# Patient Record
Sex: MALE | Race: WHITE | NOT HISPANIC OR LATINO | Employment: FULL TIME | URBAN - METROPOLITAN AREA
[De-identification: names, ages, dates, MRNs, and addresses within clinical notes are randomized per-mention and may not be internally consistent; named-entity substitution may affect disease eponyms.]

---

## 2017-01-02 ENCOUNTER — HOSPITAL ENCOUNTER (OUTPATIENT)
Dept: OTHER | Facility: HOSPITAL | Age: 37
Discharge: HOME | End: 2017-01-02
Attending: FAMILY MEDICINE | Admitting: FAMILY MEDICINE

## 2020-03-02 RX ORDER — QUETIAPINE 150 MG/1
TABLET, FILM COATED, EXTENDED RELEASE ORAL
Qty: 30 TABLET | Refills: 12 | OUTPATIENT
Start: 2020-03-02

## 2020-03-03 ENCOUNTER — OFFICE VISIT (OUTPATIENT)
Dept: FAMILY MEDICINE CLINIC | Facility: CLINIC | Age: 40
End: 2020-03-03

## 2020-03-03 VITALS
RESPIRATION RATE: 18 BRPM | HEART RATE: 101 BPM | BODY MASS INDEX: 36.79 KG/M2 | TEMPERATURE: 98.2 F | DIASTOLIC BLOOD PRESSURE: 88 MMHG | WEIGHT: 257 LBS | OXYGEN SATURATION: 96 % | SYSTOLIC BLOOD PRESSURE: 130 MMHG | HEIGHT: 70 IN

## 2020-03-03 DIAGNOSIS — F41.9 ANXIETY: ICD-10-CM

## 2020-03-03 DIAGNOSIS — F32.A DEPRESSIVE DISORDER: Primary | ICD-10-CM

## 2020-03-03 PROBLEM — M19.90 OSTEOARTHROSIS: Status: ACTIVE | Noted: 2020-03-03

## 2020-03-03 PROCEDURE — 99213 OFFICE O/P EST LOW 20 MIN: CPT | Performed by: FAMILY MEDICINE

## 2020-03-03 RX ORDER — NICOTINE POLACRILEX 4 MG/1
1 GUM, CHEWING ORAL DAILY
COMMUNITY

## 2020-03-03 RX ORDER — QUETIAPINE 150 MG/1
150 TABLET, FILM COATED, EXTENDED RELEASE ORAL DAILY
Qty: 30 TABLET | Refills: 12 | Status: SHIPPED | OUTPATIENT
Start: 2020-03-03 | End: 2021-03-09 | Stop reason: SDUPTHER

## 2020-03-03 RX ORDER — QUETIAPINE 150 MG/1
150 TABLET, FILM COATED, EXTENDED RELEASE ORAL DAILY
COMMUNITY
Start: 2020-01-31 | End: 2020-03-03 | Stop reason: SDUPTHER

## 2020-03-03 NOTE — PROGRESS NOTES
Subjective   Tommie Barron is a 39 y.o. male.     Med Refill    Depression   Visit Type: follow-up  Patient presents with the following symptoms: depressed mood and nervousness/anxiety.  Patient is not experiencing: shortness of breath and suicidal ideas.  Frequency of symptoms: most days   Sleep per night: 8 hours  Sleep quality: good  Compliance with medications:  %             The following portions of the patient's history were reviewed and updated as appropriate: allergies, current medications, past family history, past medical history, past social history, past surgical history and problem list.    Patient Active Problem List   Diagnosis   • Anxiety   • Depressive disorder   • Osteoarthrosis       Current Outpatient Medications on File Prior to Visit   Medication Sig Dispense Refill   • Boswellia-Glucosamine-Vit D (OSTEO BI-FLEX ONE PER DAY PO) Take  by mouth.     • Omeprazole 20 MG tablet delayed-release Take 1 tablet by mouth Daily.     • [DISCONTINUED] QUEtiapine XR (SEROquel XR) 150 MG 24 hr tablet Take 150 mg by mouth Daily.       No current facility-administered medications on file prior to visit.      Current outpatient and discharge medications have been reconciled for the patient.  Reviewed by: Checo Mckinley MD      No Known Allergies    Review of Systems   Constitutional: Negative for activity change, appetite change, fatigue and fever.   HENT: Negative for ear pain, swollen glands and voice change.    Eyes: Negative for visual disturbance.   Respiratory: Negative for shortness of breath and wheezing.    Cardiovascular: Negative for chest pain and leg swelling.   Gastrointestinal: Negative for abdominal pain, blood in stool, constipation, diarrhea, nausea and vomiting.   Endocrine: Negative for polydipsia and polyuria.   Genitourinary: Negative for dysuria, frequency and hematuria.   Musculoskeletal: Negative for joint swelling, neck pain and neck stiffness.   Skin: Negative for rash  and bruise.   Neurological: Negative for weakness, numbness and headache.   Psychiatric/Behavioral: Positive for depressed mood. Negative for suicidal ideas. The patient is nervous/anxious.      I have reviewed and confirmed the accuracy of the ROS as documented by the MA/LPN/RN Checo Mckinley MD      Objective   Vitals:    03/03/20 1704   BP: 130/88   Pulse: 101   Resp: 18   Temp: 98.2 °F (36.8 °C)   SpO2: 96%     Physical Exam   Constitutional: He is oriented to person, place, and time. He appears well-developed and well-nourished.   Eyes: Pupils are equal, round, and reactive to light. Conjunctivae and EOM are normal.   Neck: Normal range of motion. Neck supple.   Cardiovascular: Normal rate, regular rhythm and normal heart sounds.   Pulmonary/Chest: Effort normal and breath sounds normal.   Abdominal: Soft. Bowel sounds are normal.   Musculoskeletal: Normal range of motion.   Neurological: He is alert and oriented to person, place, and time.   Skin: Skin is warm and dry.   Psychiatric: He has a normal mood and affect. His behavior is normal. Judgment and thought content normal.         Assessment/Plan .  Problem List Items Addressed This Visit     Anxiety    Relevant Medications    QUEtiapine XR (SEROquel XR) 150 MG 24 hr tablet    Depressive disorder - Primary    Overview     [facility].  Follow-up in 2 weeks.  Consider adding wellbutrin.         Relevant Medications    QUEtiapine XR (SEROquel XR) 150 MG 24 hr tablet      Findings discussed. All questions answered.  Medication and medication adverse effects discussed.  Drug education given and explained to patient. Patient verbalized understanding.  Follow-up for routine health maintenance as directed Recommended follow-up for full physical examination and routine health maintanence.

## 2020-07-07 ENCOUNTER — TELEPHONE (OUTPATIENT)
Dept: FAMILY MEDICINE CLINIC | Facility: CLINIC | Age: 40
End: 2020-07-07

## 2020-07-07 PROCEDURE — U0003 INFECTIOUS AGENT DETECTION BY NUCLEIC ACID (DNA OR RNA); SEVERE ACUTE RESPIRATORY SYNDROME CORONAVIRUS 2 (SARS-COV-2) (CORONAVIRUS DISEASE [COVID-19]), AMPLIFIED PROBE TECHNIQUE, MAKING USE OF HIGH THROUGHPUT TECHNOLOGIES AS DESCRIBED BY CMS-2020-01-R: HCPCS | Performed by: FAMILY MEDICINE

## 2020-07-10 ENCOUNTER — TELEPHONE (OUTPATIENT)
Dept: FAMILY MEDICINE CLINIC | Facility: CLINIC | Age: 40
End: 2020-07-10

## 2020-07-10 DIAGNOSIS — J02.9 SORE THROAT: Primary | ICD-10-CM

## 2020-07-10 DIAGNOSIS — R50.9 FEVER, UNSPECIFIED FEVER CAUSE: ICD-10-CM

## 2020-07-10 RX ORDER — AZITHROMYCIN 250 MG/1
TABLET, FILM COATED ORAL
Qty: 6 TABLET | Refills: 0 | Status: SHIPPED | OUTPATIENT
Start: 2020-07-10 | End: 2021-03-09 | Stop reason: SDUPTHER

## 2020-07-10 NOTE — TELEPHONE ENCOUNTER
Pt called.  States he went to urgent care on 7-7 for s/t fever and was tested for covid.  Test came back negative but throat is still sore and still running fever and they didn't give him any antibiotics.  Notified pt per Dr. Mckinley will send in PhoneTell.  Rx sent to [facility].

## 2021-03-09 ENCOUNTER — OFFICE VISIT (OUTPATIENT)
Dept: FAMILY MEDICINE CLINIC | Facility: CLINIC | Age: 41
End: 2021-03-09

## 2021-03-09 VITALS
SYSTOLIC BLOOD PRESSURE: 130 MMHG | HEART RATE: 104 BPM | WEIGHT: 261.8 LBS | OXYGEN SATURATION: 95 % | TEMPERATURE: 97.3 F | DIASTOLIC BLOOD PRESSURE: 80 MMHG | RESPIRATION RATE: 17 BRPM | HEIGHT: 70 IN | BODY MASS INDEX: 37.48 KG/M2

## 2021-03-09 DIAGNOSIS — F32.A DEPRESSIVE DISORDER: ICD-10-CM

## 2021-03-09 DIAGNOSIS — Z00.01 ENCOUNTER FOR ANNUAL GENERAL MEDICAL EXAMINATION WITH ABNORMAL FINDINGS IN ADULT: Primary | ICD-10-CM

## 2021-03-09 DIAGNOSIS — Z72.0 TOBACCO USE: ICD-10-CM

## 2021-03-09 DIAGNOSIS — E66.3 OVER WEIGHT: ICD-10-CM

## 2021-03-09 DIAGNOSIS — F41.9 ANXIETY: ICD-10-CM

## 2021-03-09 DIAGNOSIS — R05.9 COUGH: ICD-10-CM

## 2021-03-09 DIAGNOSIS — E66.01 MORBIDLY OBESE (HCC): ICD-10-CM

## 2021-03-09 DIAGNOSIS — J40 BRONCHITIS: ICD-10-CM

## 2021-03-09 PROCEDURE — 99213 OFFICE O/P EST LOW 20 MIN: CPT | Performed by: FAMILY MEDICINE

## 2021-03-09 PROCEDURE — 99396 PREV VISIT EST AGE 40-64: CPT | Performed by: FAMILY MEDICINE

## 2021-03-09 RX ORDER — NICOTINE 21-14-7MG
1 KIT TRANSDERMAL DAILY
Qty: 1 EACH | Refills: 0 | Status: SHIPPED | OUTPATIENT
Start: 2021-03-09 | End: 2023-02-14

## 2021-03-09 RX ORDER — QUETIAPINE 150 MG/1
150 TABLET, FILM COATED, EXTENDED RELEASE ORAL DAILY
Qty: 30 TABLET | Refills: 12 | Status: SHIPPED | OUTPATIENT
Start: 2021-03-09 | End: 2022-02-14 | Stop reason: SDUPTHER

## 2021-03-09 RX ORDER — AZITHROMYCIN 250 MG/1
TABLET, FILM COATED ORAL
Qty: 6 TABLET | Refills: 0 | Status: SHIPPED | OUTPATIENT
Start: 2021-03-09 | End: 2022-02-14

## 2021-03-09 NOTE — PROGRESS NOTES
Subjective   Tommie Barron is a 40 y.o. male.     The patient is here: for coordination of medical care and annual wellness examination. Patient has: moderate activity with work/home activities, good appetite, feels well with minor complaints, good energy level and is sleeping well  Patient's last tetanus shot was >10 years ago.     Depression  Visit Type: follow-up  Patient presents with the following symptoms: depressed mood and nervousness/anxiety.  Patient is not experiencing: shortness of breath and suicidal ideas.  Frequency of symptoms: most days   Sleep per night: 8 hours  Sleep quality: good  Nighttime awakenings: one to two  Compliance with medications:  %         The following portions of the patient's history were reviewed and updated as appropriate: allergies, current medications, past family history, past medical history, past social history, past surgical history and problem list.    Patient Active Problem List   Diagnosis   • Anxiety   • Depressive disorder   • Osteoarthrosis   • Encounter for annual general medical examination with abnormal findings in adult   • Over weight   • Cough   • GERD (gastroesophageal reflux disease)   • Morbidly obese (CMS/[facility])       Current Outpatient Medications on File Prior to Visit   Medication Sig Dispense Refill   • Boswellia-Glucosamine-Vit D (OSTEO BI-FLEX ONE PER DAY PO) Take  by mouth.     • Omeprazole 20 MG tablet delayed-release Take 1 tablet by mouth Daily.     • [DISCONTINUED] QUEtiapine XR (SEROquel XR) 150 MG 24 hr tablet Take 1 tablet by mouth Daily. 30 tablet 12   • [DISCONTINUED] azithromycin (ZITHROMAX) 250 MG tablet Take 2 tablets the first day, then 1 tablet daily for 4 days. 6 tablet 0     No current facility-administered medications on file prior to visit.     Current outpatient and discharge medications have been reconciled for the patient.  Reviewed by: Checo Mckinley MD      No Known Allergies    Review of Systems   Constitutional:  Negative for activity change, appetite change, fatigue and fever.   HENT: Negative for ear pain, swollen glands and voice change.    Eyes: Negative for visual disturbance.   Respiratory: Negative for shortness of breath and wheezing.    Cardiovascular: Negative for chest pain and leg swelling.   Gastrointestinal: Negative for abdominal pain, blood in stool, constipation, diarrhea, nausea and vomiting.   Endocrine: Negative for polydipsia and polyuria.   Genitourinary: Negative for dysuria, frequency and hematuria.   Musculoskeletal: Negative for joint swelling, neck pain and neck stiffness.   Skin: Negative for rash and bruise.   Neurological: Negative for weakness, numbness and headache.   Psychiatric/Behavioral: Positive for depressed mood. Negative for suicidal ideas. The patient is nervous/anxious.      I have reviewed and confirmed the accuracy of the ROS as documented by the MA/LPN/RN Checo Mckinley MD    Objective   Vitals:    03/09/21 0929   BP: 130/80   Pulse: 104   Resp: 17   Temp: 97.3 °F (36.3 °C)   SpO2: 95%     Physical Exam  Constitutional:       Appearance: He is well-developed.   HENT:      Head: Normocephalic and atraumatic.      Right Ear: External ear normal.      Left Ear: External ear normal.      Nose: Nose normal.   Eyes:      Pupils: Pupils are equal, round, and reactive to light.   Cardiovascular:      Rate and Rhythm: Normal rate and regular rhythm.      Heart sounds: Normal heart sounds.   Pulmonary:      Effort: Pulmonary effort is normal.      Breath sounds: Normal breath sounds.   Abdominal:      General: Bowel sounds are normal.      Palpations: Abdomen is soft.   Musculoskeletal:         General: Normal range of motion.      Cervical back: Normal range of motion and neck supple.   Skin:     General: Skin is warm and dry.   Neurological:      Mental Status: He is alert and oriented to person, place, and time.   Psychiatric:         Behavior: Behavior normal.         Thought  Content: Thought content normal.         Judgment: Judgment normal.           Assessment/Plan .  Problem List Items Addressed This Visit     Anxiety    Relevant Medications    QUEtiapine XR (SEROquel XR) 150 MG 24 hr tablet    Depressive disorder    Overview     numbers to [facility] given.  Follow-up in 2 weeks.  Consider adding wellbutrin.         Relevant Medications    QUEtiapine XR (SEROquel XR) 150 MG 24 hr tablet    Nicotine 21-14-7 MG/24HR kit    Encounter for annual general medical examination with abnormal findings in adult - Primary    Relevant Orders    POC Urinalysis Dipstick, Multipro    CBC Auto Differential    Comprehensive Metabolic Panel    Lipid Panel With / Chol / HDL Ratio    TSH    Over weight    Cough    Current Assessment & Plan     tx empirically.          Relevant Medications    guaiFENesin (Mucinex) 600 MG 12 hr tablet    azithromycin (ZITHROMAX) 250 MG tablet    Morbidly obese (CMS/HCC)      Other Visit Diagnoses     Bronchitis        Relevant Medications    guaiFENesin (Mucinex) 600 MG 12 hr tablet    azithromycin (ZITHROMAX) 250 MG tablet    Tobacco use        Relevant Medications    Nicotine 21-14-7 MG/24HR kit       Discussed anticipatory guidance, diet, exercise, and weight loss.  Discussed safety and routine screening examinations.  Discussed self-examinations.  Findings discussed. All questions answered.  Medication and medication adverse effects discussed.  Drug education given and explained to patient. Patient verbalized understanding.  Follow-up in 4-6 weeks if not better.  Follow-up sooner for worsening symptoms or for any concerns.         I wore protective equipment throughout this patient encounter to include mask and eye protection. Hand hygiene was performed before donning protective equipment and after removal when leaving the room

## 2021-03-12 ENCOUNTER — TELEPHONE (OUTPATIENT)
Dept: FAMILY MEDICINE CLINIC | Facility: CLINIC | Age: 41
End: 2021-03-12

## 2021-03-12 LAB
ALBUMIN SERPL-MCNC: 4.3 G/DL (ref 4–5)
ALBUMIN/GLOB SERPL: 1.3 {RATIO} (ref 1.2–2.2)
ALP SERPL-CCNC: 92 IU/L (ref 39–117)
ALT SERPL-CCNC: 31 IU/L (ref 0–44)
AST SERPL-CCNC: 22 IU/L (ref 0–40)
BASOPHILS # BLD AUTO: 0 X10E3/UL (ref 0–0.2)
BASOPHILS NFR BLD AUTO: 1 %
BILIRUB SERPL-MCNC: 0.4 MG/DL (ref 0–1.2)
BUN SERPL-MCNC: 14 MG/DL (ref 6–24)
BUN/CREAT SERPL: 16 (ref 9–20)
CALCIUM SERPL-MCNC: 9.3 MG/DL (ref 8.7–10.2)
CHLORIDE SERPL-SCNC: 104 MMOL/L (ref 96–106)
CHOLEST SERPL-MCNC: 153 MG/DL (ref 100–199)
CHOLEST/HDLC SERPL: 3.8 RATIO (ref 0–5)
CO2 SERPL-SCNC: 21 MMOL/L (ref 20–29)
CREAT SERPL-MCNC: 0.89 MG/DL (ref 0.76–1.27)
EOSINOPHIL # BLD AUTO: 0.2 X10E3/UL (ref 0–0.4)
EOSINOPHIL NFR BLD AUTO: 3 %
ERYTHROCYTE [DISTWIDTH] IN BLOOD BY AUTOMATED COUNT: 13.8 % (ref 11.6–15.4)
GLOBULIN SER CALC-MCNC: 3.3 G/DL (ref 1.5–4.5)
GLUCOSE SERPL-MCNC: 95 MG/DL (ref 65–99)
HCT VFR BLD AUTO: 48.1 % (ref 37.5–51)
HDLC SERPL-MCNC: 40 MG/DL
HGB BLD-MCNC: 15.8 G/DL (ref 13–17.7)
IMM GRANULOCYTES # BLD AUTO: 0 X10E3/UL (ref 0–0.1)
IMM GRANULOCYTES NFR BLD AUTO: 1 %
LDLC SERPL CALC-MCNC: 98 MG/DL (ref 0–99)
LYMPHOCYTES # BLD AUTO: 2.9 X10E3/UL (ref 0.7–3.1)
LYMPHOCYTES NFR BLD AUTO: 37 %
MCH RBC QN AUTO: 29.5 PG (ref 26.6–33)
MCHC RBC AUTO-ENTMCNC: 32.8 G/DL (ref 31.5–35.7)
MCV RBC AUTO: 90 FL (ref 79–97)
MONOCYTES # BLD AUTO: 0.8 X10E3/UL (ref 0.1–0.9)
MONOCYTES NFR BLD AUTO: 10 %
NEUTROPHILS # BLD AUTO: 3.9 X10E3/UL (ref 1.4–7)
NEUTROPHILS NFR BLD AUTO: 48 %
PLATELET # BLD AUTO: 321 X10E3/UL (ref 150–450)
POTASSIUM SERPL-SCNC: 4.5 MMOL/L (ref 3.5–5.2)
PROT SERPL-MCNC: 7.6 G/DL (ref 6–8.5)
RBC # BLD AUTO: 5.35 X10E6/UL (ref 4.14–5.8)
SODIUM SERPL-SCNC: 139 MMOL/L (ref 134–144)
TRIGL SERPL-MCNC: 80 MG/DL (ref 0–149)
TSH SERPL DL<=0.005 MIU/L-ACNC: 1.74 UIU/ML (ref 0.45–4.5)
VLDLC SERPL CALC-MCNC: 15 MG/DL (ref 5–40)
WBC # BLD AUTO: 7.9 X10E3/UL (ref 3.4–10.8)

## 2021-03-12 NOTE — TELEPHONE ENCOUNTER
----- Message from Checo Mckinley MD sent at 3/12/2021  9:57 AM EST -----  Please notify patient that labs were normal.

## 2022-02-14 ENCOUNTER — OFFICE VISIT (OUTPATIENT)
Dept: FAMILY MEDICINE CLINIC | Facility: CLINIC | Age: 42
End: 2022-02-14

## 2022-02-14 VITALS
HEIGHT: 70 IN | RESPIRATION RATE: 20 BRPM | HEART RATE: 102 BPM | SYSTOLIC BLOOD PRESSURE: 114 MMHG | OXYGEN SATURATION: 99 % | DIASTOLIC BLOOD PRESSURE: 80 MMHG | BODY MASS INDEX: 36.71 KG/M2 | TEMPERATURE: 97.5 F | WEIGHT: 256.4 LBS

## 2022-02-14 DIAGNOSIS — F41.9 ANXIETY: ICD-10-CM

## 2022-02-14 DIAGNOSIS — K64.8 OTHER HEMORRHOIDS: ICD-10-CM

## 2022-02-14 DIAGNOSIS — F32.A DEPRESSIVE DISORDER: Primary | ICD-10-CM

## 2022-02-14 PROCEDURE — 99213 OFFICE O/P EST LOW 20 MIN: CPT | Performed by: FAMILY MEDICINE

## 2022-02-14 RX ORDER — HYDROCORTISONE 25 MG/G
CREAM TOPICAL 2 TIMES DAILY
Qty: 1 EACH | Refills: 5 | Status: SHIPPED | OUTPATIENT
Start: 2022-02-14 | End: 2023-02-14

## 2022-02-14 RX ORDER — QUETIAPINE 150 MG/1
150 TABLET, FILM COATED, EXTENDED RELEASE ORAL DAILY
Qty: 90 TABLET | Refills: 3 | Status: SHIPPED | OUTPATIENT
Start: 2022-02-14 | End: 2023-02-13

## 2022-02-14 NOTE — PROGRESS NOTES
Subjective   Tommie Barron is a 41 y.o. male.   Chief Complaint   Patient presents with   • Hemorrhoids   • Depression       Doing well on seroquel. Needs refill.  Ongoing hemorrhoids. Using OTC meds without relief     Depression  Visit Type: follow-up  Patient presents with the following symptoms: depressed mood, fatigue, nervousness/anxiety and palpitations.  Patient is not experiencing: shortness of breath and suicidal ideas.  Frequency of symptoms: most days   Sleep per night: 8 hours  Sleep quality: good  Nighttime awakenings: one to two  Compliance with medications:  %        Hemorrhoids  This is a new problem. The current episode started in the past 7 days. The problem occurs constantly. Associated symptoms include fatigue. Pertinent negatives include no abdominal pain, congestion, coughing, swollen glands or urinary symptoms. Nothing aggravates the symptoms. He has tried nothing for the symptoms. The treatment provided no relief.        The following portions of the patient's history were reviewed and updated as appropriate: allergies, current medications, past family history, past medical history, past social history, past surgical history and problem list.    Patient Active Problem List   Diagnosis   • Anxiety   • Depressive disorder   • Osteoarthrosis   • Encounter for annual general medical examination with abnormal findings in adult   • Over weight   • Cough   • GERD (gastroesophageal reflux disease)   • Morbidly obese (HCC)       Current Outpatient Medications on File Prior to Visit   Medication Sig Dispense Refill   • Boswellia-Glucosamine-Vit D (OSTEO BI-FLEX ONE PER DAY PO) Take  by mouth.     • Nicotine 21-14-7 MG/24HR kit Place 1 patch on the skin as directed by provider Daily. 1 each 0   • Omeprazole 20 MG tablet delayed-release Take 1 tablet by mouth Daily.     • [DISCONTINUED] QUEtiapine XR (SEROquel XR) 150 MG 24 hr tablet Take 1 tablet by mouth Daily. 30 tablet 12   • [DISCONTINUED]  "azithromycin (ZITHROMAX) 250 MG tablet Take 2 tablets the first day, then 1 tablet daily for 4 days. 6 tablet 0     No current facility-administered medications on file prior to visit.     Current outpatient and discharge medications have been reconciled for the patient.  Reviewed by: Checo Mckinley MD      No Known Allergies    Review of Systems   Constitutional: Positive for fatigue.   HENT: Negative for congestion and swollen glands.    Respiratory: Negative for cough and shortness of breath.    Cardiovascular: Positive for palpitations.   Gastrointestinal: Positive for hemorrhoids. Negative for abdominal pain.   Psychiatric/Behavioral: Positive for depressed mood. Negative for suicidal ideas. The patient is nervous/anxious.      I have reviewed and confirmed the accuracy of the ROS as documented by the MA/LPN/RN Checo Mckinley MD    Objective   Visit Vitals  /80 (BP Location: Right arm, Patient Position: Sitting, Cuff Size: Adult)   Pulse 102   Temp 97.5 °F (36.4 °C)   Resp 20   Ht 176.5 cm (69.5\")   Wt 116 kg (256 lb 6.4 oz)   SpO2 99%   BMI 37.32 kg/m²       Physical Exam  HENT:      Nose: Nose normal.   Pulmonary:      Effort: Pulmonary effort is normal.   Neurological:      Mental Status: He is alert.   Psychiatric:         Mood and Affect: Mood normal.         Behavior: Behavior normal.         Thought Content: Thought content normal.         Judgment: Judgment normal.       Derm Physical Exam    Assessment/Plan .    Diagnoses and all orders for this visit:    1. Depressive disorder (Primary)  Overview:  numbers to [facility] given.  Follow-up in 2 weeks.  Consider adding wellbutrin.    Orders:  -     QUEtiapine XR (SEROquel XR) 150 MG 24 hr tablet; Take 1 tablet by mouth Daily.  Dispense: 90 tablet; Refill: 3    2. Other hemorrhoids  Comments:  trial of anusol hc. Consider surgical referral if sx persistent   Orders:  -     Hydrocortisone, Perianal, (ANUSOL-HC) 2.5 % " rectal cream; Insert  into the rectum 2 (Two) Times a Day.  Dispense: 1 each; Refill: 5    3. Anxiety  -     QUEtiapine XR (SEROquel XR) 150 MG 24 hr tablet; Take 1 tablet by mouth Daily.  Dispense: 90 tablet; Refill: 3   Findings discussed. All questions answered.  Medication and medication adverse effects discussed.  Drug education given and explained to patient. Patient verbalized understanding.  Follow-up in 2 weeks if not better.  Follow-up sooner for worsening symptoms or for any concerns.    Follow-up for routine health maintenance as directed     I wore protective equipment throughout this patient encounter to include mask and gloves. Hand hygiene was performed before donning protective equipment and after removal when leaving the room

## 2022-02-18 ENCOUNTER — OFFICE VISIT (OUTPATIENT)
Dept: FAMILY MEDICINE CLINIC | Facility: CLINIC | Age: 42
End: 2022-02-18

## 2022-02-18 VITALS
TEMPERATURE: 97.1 F | OXYGEN SATURATION: 99 % | BODY MASS INDEX: 49.83 KG/M2 | SYSTOLIC BLOOD PRESSURE: 116 MMHG | WEIGHT: 253.8 LBS | HEART RATE: 86 BPM | RESPIRATION RATE: 18 BRPM | HEIGHT: 60 IN | DIASTOLIC BLOOD PRESSURE: 78 MMHG

## 2022-02-18 DIAGNOSIS — L02.92 FURUNCLE: Primary | ICD-10-CM

## 2022-02-18 PROCEDURE — 99213 OFFICE O/P EST LOW 20 MIN: CPT | Performed by: FAMILY MEDICINE

## 2022-02-18 RX ORDER — SULFAMETHOXAZOLE AND TRIMETHOPRIM 800; 160 MG/1; MG/1
1 TABLET ORAL 2 TIMES DAILY
Qty: 20 TABLET | Refills: 0 | Status: SHIPPED | OUTPATIENT
Start: 2022-02-18 | End: 2022-02-18

## 2022-02-18 RX ORDER — SULFAMETHOXAZOLE AND TRIMETHOPRIM 800; 160 MG/1; MG/1
1 TABLET ORAL 2 TIMES DAILY
Qty: 20 TABLET | Refills: 1 | Status: SHIPPED | OUTPATIENT
Start: 2022-02-18 | End: 2023-02-14

## 2022-02-18 NOTE — PROGRESS NOTES
"Subjective   Tommie Barron is a 41 y.o. male.   Chief Complaint   Patient presents with   • Abscess       Right buttock pain x 2 days. Started draining spontaneously. Felt possibly feverish yesterday, better today     Wound Check  There has been colored discharge from the wound. The pain has worsened.        The following portions of the patient's history were reviewed and updated as appropriate: allergies, current medications, past family history, past medical history, past social history, past surgical history and problem list.    Patient Active Problem List   Diagnosis   • Anxiety   • Depressive disorder   • Osteoarthrosis   • Encounter for annual general medical examination with abnormal findings in adult   • Over weight   • Cough   • GERD (gastroesophageal reflux disease)   • Morbidly obese (HCC)       Current Outpatient Medications on File Prior to Visit   Medication Sig Dispense Refill   • Boswellia-Glucosamine-Vit D (OSTEO BI-FLEX ONE PER DAY PO) Take  by mouth.     • Hydrocortisone, Perianal, (ANUSOL-HC) 2.5 % rectal cream Insert  into the rectum 2 (Two) Times a Day. 1 each 5   • Nicotine 21-14-7 MG/24HR kit Place 1 patch on the skin as directed by provider Daily. 1 each 0   • Omeprazole 20 MG tablet delayed-release Take 1 tablet by mouth Daily.     • QUEtiapine XR (SEROquel XR) 150 MG 24 hr tablet Take 1 tablet by mouth Daily. 90 tablet 3     No current facility-administered medications on file prior to visit.     Current outpatient and discharge medications have been reconciled for the patient.  Reviewed by: Checo Mckinley MD      No Known Allergies    Review of Systems   Skin: Positive for skin lesions.     I have reviewed and confirmed the accuracy of the ROS as documented by the MA/LPN/RN Checo Mckinley MD    Objective   Visit Vitals  /78 (BP Location: Right arm, Patient Position: Sitting, Cuff Size: Adult)   Pulse 86   Temp 97.1 °F (36.2 °C)   Resp 18   Ht 69.5 cm (27.36\")   Wt " 115 kg (253 lb 12.8 oz)   SpO2 99%   .34 kg/m²       Physical Exam  Genitourinary:         Comments: Area of induration and small amount of purulent drainage. Minimal erythema       Physical Exam   Pelvic   Pelvic comments: Area of induration and small amount of purulent drainage. Minimal erythema         Assessment/Plan .    Diagnoses and all orders for this visit:    1. Furuncle (Primary)  Comments:  buttock/perirectal. Draining spontaneously. If not improved with atbx, will refer to surgery  Orders:  -     Discontinue: sulfamethoxazole-trimethoprim (BACTRIM DS,SEPTRA DS) 800-160 MG per tablet; Take 1 tablet by mouth 2 (Two) Times a Day.  Dispense: 20 tablet; Refill: 0  -     sulfamethoxazole-trimethoprim (BACTRIM DS,SEPTRA DS) 800-160 MG per tablet; Take 1 tablet by mouth 2 (Two) Times a Day.  Dispense: 20 tablet; Refill: 1     Findings discussed. All questions answered.  Medication and medication adverse effects discussed.  Drug education given and explained to patient. Patient verbalized understanding.   Warm compressed.      I wore protective equipment throughout this patient encounter to include mask and gloves. Hand hygiene was performed before donning protective equipment and after removal when leaving the room

## 2023-02-11 DIAGNOSIS — F41.9 ANXIETY: ICD-10-CM

## 2023-02-11 DIAGNOSIS — F32.A DEPRESSIVE DISORDER: ICD-10-CM

## 2023-02-13 RX ORDER — QUETIAPINE 150 MG/1
TABLET, FILM COATED, EXTENDED RELEASE ORAL
Qty: 30 TABLET | Refills: 11 | Status: SHIPPED | OUTPATIENT
Start: 2023-02-13 | End: 2023-02-14 | Stop reason: SDUPTHER

## 2023-02-14 ENCOUNTER — OFFICE VISIT (OUTPATIENT)
Dept: FAMILY MEDICINE CLINIC | Facility: CLINIC | Age: 43
End: 2023-02-14
Payer: COMMERCIAL

## 2023-02-14 VITALS
BODY MASS INDEX: 36.88 KG/M2 | TEMPERATURE: 97.7 F | DIASTOLIC BLOOD PRESSURE: 64 MMHG | HEIGHT: 70 IN | HEART RATE: 80 BPM | RESPIRATION RATE: 18 BRPM | SYSTOLIC BLOOD PRESSURE: 106 MMHG | OXYGEN SATURATION: 97 % | WEIGHT: 257.6 LBS

## 2023-02-14 DIAGNOSIS — F32.A DEPRESSIVE DISORDER: ICD-10-CM

## 2023-02-14 DIAGNOSIS — F41.9 ANXIETY: ICD-10-CM

## 2023-02-14 DIAGNOSIS — Z00.00 ENCOUNTER FOR WELLNESS EXAMINATION: Primary | ICD-10-CM

## 2023-02-14 PROBLEM — Z72.0 TOBACCO USE: Status: ACTIVE | Noted: 2023-02-14

## 2023-02-14 PROBLEM — E66.3 OVER WEIGHT: Status: RESOLVED | Noted: 2021-03-09 | Resolved: 2023-02-14

## 2023-02-14 PROBLEM — E66.812 CLASS 2 SEVERE OBESITY DUE TO EXCESS CALORIES WITH SERIOUS COMORBIDITY AND BODY MASS INDEX (BMI) OF 37.0 TO 37.9 IN ADULT: Status: ACTIVE | Noted: 2021-03-09

## 2023-02-14 PROBLEM — E66.01 MORBIDLY OBESE (HCC): Status: RESOLVED | Noted: 2021-03-09 | Resolved: 2023-02-14

## 2023-02-14 LAB
BILIRUB BLD-MCNC: NEGATIVE MG/DL
CLARITY, POC: CLEAR
COLOR UR: YELLOW
GLUCOSE UR STRIP-MCNC: NEGATIVE MG/DL
KETONES UR QL: NEGATIVE
LEUKOCYTE EST, POC: NEGATIVE
NITRITE UR-MCNC: NEGATIVE MG/ML
PH UR: 5 [PH] (ref 5–8)
PROT UR STRIP-MCNC: NEGATIVE MG/DL
RBC # UR STRIP: NEGATIVE /UL
SP GR UR: 1.01 (ref 1–1.03)
UROBILINOGEN UR QL: NORMAL

## 2023-02-14 PROCEDURE — 99396 PREV VISIT EST AGE 40-64: CPT | Performed by: FAMILY MEDICINE

## 2023-02-14 PROCEDURE — 81002 URINALYSIS NONAUTO W/O SCOPE: CPT | Performed by: FAMILY MEDICINE

## 2023-02-14 RX ORDER — QUETIAPINE 150 MG/1
150 TABLET, FILM COATED, EXTENDED RELEASE ORAL DAILY
Qty: 30 TABLET | Refills: 11 | Status: SHIPPED | OUTPATIENT
Start: 2023-02-14

## 2023-02-14 NOTE — PROGRESS NOTES
Subjective   Tommie Barron is a 42 y.o. male.   Chief Complaint   Patient presents with   • Annual Exam   • Depression       History of Present Illness  The patient is here: for coordination of medical care.  Patient has: moderate activity with work/home activities    COVID-19 Vaccine(1) Never done  Pneumococcal Vaccine 0-64(1 - PCV) Never done  TDAP/TD VACCINES(3 - Tdap) due on 01/01/2016  HEPATITIS C SCREENING Never done  ANNUAL PHYSICAL due on 03/09/2022  INFLUENZA VACCINE Never done  Current pain scale 0/10.  Depression  Visit Type: follow-up  Patient presents with the following symptoms: depressed mood, fatigue, nervousness/anxiety and palpitations.  Patient is not experiencing: shortness of breath and suicidal ideas.  Frequency of symptoms: most days   Sleep per night: 8 hours  Sleep quality: good  Nighttime awakenings: one to two  Compliance with medications:  %             The following portions of the patient's history were reviewed and updated as appropriate: allergies, current medications, past family history, past medical history, past social history, past surgical history and problem list.    Patient Active Problem List   Diagnosis   • Anxiety   • Depressive disorder   • Osteoarthrosis   • Encounter for annual general medical examination with abnormal findings in adult   • Cough   • GERD (gastroesophageal reflux disease)   • Class 2 severe obesity due to excess calories with serious comorbidity and body mass index (BMI) of 37.0 to 37.9 in adult (HCC)   • Tobacco use       Current Outpatient Medications on File Prior to Visit   Medication Sig Dispense Refill   • Boswellia-Glucosamine-Vit D (OSTEO BI-FLEX ONE PER DAY PO) Take  by mouth.     • Omeprazole 20 MG tablet delayed-release Take 1 tablet by mouth Daily.     • [DISCONTINUED] QUEtiapine XR (SEROquel XR) 150 MG 24 hr tablet TAKE 1 TABLET BY MOUTH EVERY DAY 30 tablet 11   • [DISCONTINUED] Hydrocortisone, Perianal, (ANUSOL-HC) 2.5 % rectal cream  "Insert  into the rectum 2 (Two) Times a Day. 1 each 5   • [DISCONTINUED] Nicotine 21-14-7 MG/24HR kit Place 1 patch on the skin as directed by provider Daily. 1 each 0   • [DISCONTINUED] sulfamethoxazole-trimethoprim (BACTRIM DS,SEPTRA DS) 800-160 MG per tablet Take 1 tablet by mouth 2 (Two) Times a Day. 20 tablet 1     No current facility-administered medications on file prior to visit.     Current outpatient and discharge medications have been reconciled for the patient.  Reviewed by: Checo Mckinley MD      No Known Allergies    Review of Systems   Respiratory: Negative for shortness of breath.    Cardiovascular: Positive for palpitations.   Psychiatric/Behavioral: Positive for depressed mood. Negative for suicidal ideas. The patient is nervous/anxious.      I have reviewed and confirmed the accuracy of the ROS as documented by the MA/LPN/RN Checo Mckinley MD    Objective   Visit Vitals  /64 (BP Location: Right arm, Patient Position: Sitting, Cuff Size: Adult)   Pulse 80   Temp 97.7 °F (36.5 °C)   Resp 18   Ht 176.5 cm (69.5\")   Wt 117 kg (257 lb 9.6 oz)   SpO2 97%   BMI 37.50 kg/m²      **  Physical Exam  Vitals reviewed.   Constitutional:       Appearance: He is well-developed.   HENT:      Head: Normocephalic.      Right Ear: External ear normal.      Left Ear: External ear normal.      Nose: Nose normal.   Eyes:      Conjunctiva/sclera: Conjunctivae normal.   Cardiovascular:      Rate and Rhythm: Normal rate.   Pulmonary:      Effort: Pulmonary effort is normal. No respiratory distress.   Abdominal:      General: Abdomen is flat.   Musculoskeletal:         General: No signs of injury.      Cervical back: Normal range of motion.   Skin:     Findings: No rash.   Neurological:      Mental Status: He is alert and oriented to person, place, and time.   Psychiatric:         Behavior: Behavior normal.         Thought Content: Thought content normal.         Judgment: Judgment normal.       Derm " Physical Exam    Diagnoses and all orders for this visit:    1. Encounter for wellness examination (Primary)  -     POCT urinalysis dipstick, manual  -     CBC & Differential  -     Comprehensive Metabolic Panel  -     Lipid Panel With / Chol / HDL Ratio    2. Depressive disorder  Comments:  doing well on seroquel   Overview:  numbers to [facility] given.  Follow-up in 2 weeks.  Consider adding wellbutrin.    Orders:  -     QUEtiapine XR (SEROquel XR) 150 MG 24 hr tablet; Take 1 tablet by mouth Daily.  Dispense: 30 tablet; Refill: 11    3. Anxiety  -     QUEtiapine XR (SEROquel XR) 150 MG 24 hr tablet; Take 1 tablet by mouth Daily.  Dispense: 30 tablet; Refill: 11   Findings discussed. All questions answered.  Discussed anticipatory guidance, diet, exercise, and weight loss.  Discussed safety and routine screening examinations.  Discussed self-examinations.  Class 2 Severe Obesity (BMI >=35 and <=39.9). Obesity-related health conditions include the following: none. Obesity is unchanged. BMI is is above average; BMI management plan is completed. We discussed portion control and increasing exercise.     Tommie Barron  reports that he has been smoking cigarettes. He has a 7.50 pack-year smoking history. He has quit using smokeless tobacco.  His smokeless tobacco use included chew..Follow-up for routine health maintenance as indicated.    Expected course, medications, and adverse effects discussed as appropriate.  Call or return if worsening or persistent symptoms.  I wore protective equipment throughout this patient encounter to include mask and eye protection. Hand hygiene was performed before donning protective equipment and after removal when leaving the room.       This document is intended for medical professional use only.

## 2023-02-22 LAB
ALBUMIN SERPL-MCNC: 4.9 G/DL (ref 4–5)
ALBUMIN/GLOB SERPL: 1.8 {RATIO} (ref 1.2–2.2)
ALP SERPL-CCNC: 98 IU/L (ref 44–121)
ALT SERPL-CCNC: 26 IU/L (ref 0–44)
AST SERPL-CCNC: 13 IU/L (ref 0–40)
BASOPHILS # BLD AUTO: 0 X10E3/UL (ref 0–0.2)
BASOPHILS NFR BLD AUTO: 1 %
BILIRUB SERPL-MCNC: 0.6 MG/DL (ref 0–1.2)
BUN SERPL-MCNC: 16 MG/DL (ref 6–24)
BUN/CREAT SERPL: 16 (ref 9–20)
CALCIUM SERPL-MCNC: 9.7 MG/DL (ref 8.7–10.2)
CHLORIDE SERPL-SCNC: 100 MMOL/L (ref 96–106)
CHOLEST SERPL-MCNC: 173 MG/DL (ref 100–199)
CHOLEST/HDLC SERPL: 4.2 RATIO (ref 0–5)
CO2 SERPL-SCNC: 26 MMOL/L (ref 20–29)
CREAT SERPL-MCNC: 1.03 MG/DL (ref 0.76–1.27)
EGFRCR SERPLBLD CKD-EPI 2021: 92 ML/MIN/1.73
EOSINOPHIL # BLD AUTO: 0.2 X10E3/UL (ref 0–0.4)
EOSINOPHIL NFR BLD AUTO: 2 %
ERYTHROCYTE [DISTWIDTH] IN BLOOD BY AUTOMATED COUNT: 13.6 % (ref 11.6–15.4)
GLOBULIN SER CALC-MCNC: 2.8 G/DL (ref 1.5–4.5)
GLUCOSE SERPL-MCNC: 103 MG/DL (ref 70–99)
HCT VFR BLD AUTO: 48.1 % (ref 37.5–51)
HDLC SERPL-MCNC: 41 MG/DL
HGB BLD-MCNC: 16.3 G/DL (ref 13–17.7)
IMM GRANULOCYTES # BLD AUTO: 0 X10E3/UL (ref 0–0.1)
IMM GRANULOCYTES NFR BLD AUTO: 0 %
LDLC SERPL CALC-MCNC: 115 MG/DL (ref 0–99)
LYMPHOCYTES # BLD AUTO: 3.4 X10E3/UL (ref 0.7–3.1)
LYMPHOCYTES NFR BLD AUTO: 38 %
MCH RBC QN AUTO: 30.2 PG (ref 26.6–33)
MCHC RBC AUTO-ENTMCNC: 33.9 G/DL (ref 31.5–35.7)
MCV RBC AUTO: 89 FL (ref 79–97)
MONOCYTES # BLD AUTO: 0.9 X10E3/UL (ref 0.1–0.9)
MONOCYTES NFR BLD AUTO: 10 %
NEUTROPHILS # BLD AUTO: 4.3 X10E3/UL (ref 1.4–7)
NEUTROPHILS NFR BLD AUTO: 49 %
PLATELET # BLD AUTO: 296 X10E3/UL (ref 150–450)
POTASSIUM SERPL-SCNC: 5 MMOL/L (ref 3.5–5.2)
PROT SERPL-MCNC: 7.7 G/DL (ref 6–8.5)
RBC # BLD AUTO: 5.39 X10E6/UL (ref 4.14–5.8)
SODIUM SERPL-SCNC: 141 MMOL/L (ref 134–144)
TRIGL SERPL-MCNC: 94 MG/DL (ref 0–149)
VLDLC SERPL CALC-MCNC: 17 MG/DL (ref 5–40)
WBC # BLD AUTO: 8.9 X10E3/UL (ref 3.4–10.8)

## 2023-02-23 ENCOUNTER — TELEPHONE (OUTPATIENT)
Dept: FAMILY MEDICINE CLINIC | Facility: CLINIC | Age: 43
End: 2023-02-23
Payer: COMMERCIAL

## 2023-02-23 NOTE — TELEPHONE ENCOUNTER
----- Message from Checo Mckinley MD sent at 2/23/2023 11:41 AM EST -----  Please notify patient that labs were normal.

## 2023-05-24 ENCOUNTER — OFFICE VISIT (OUTPATIENT)
Dept: FAMILY MEDICINE CLINIC | Facility: CLINIC | Age: 43
End: 2023-05-24
Payer: COMMERCIAL

## 2023-05-24 ENCOUNTER — TELEPHONE (OUTPATIENT)
Dept: FAMILY MEDICINE CLINIC | Facility: CLINIC | Age: 43
End: 2023-05-24

## 2023-05-24 VITALS
DIASTOLIC BLOOD PRESSURE: 79 MMHG | HEART RATE: 99 BPM | WEIGHT: 254 LBS | TEMPERATURE: 98.7 F | RESPIRATION RATE: 16 BRPM | HEIGHT: 70 IN | BODY MASS INDEX: 36.36 KG/M2 | OXYGEN SATURATION: 95 % | SYSTOLIC BLOOD PRESSURE: 115 MMHG

## 2023-05-24 DIAGNOSIS — H65.02 NON-RECURRENT ACUTE SEROUS OTITIS MEDIA OF LEFT EAR: ICD-10-CM

## 2023-05-24 DIAGNOSIS — R05.1 ACUTE COUGH: ICD-10-CM

## 2023-05-24 DIAGNOSIS — J40 BRONCHITIS: Primary | ICD-10-CM

## 2023-05-24 DIAGNOSIS — J30.1 SEASONAL ALLERGIC RHINITIS DUE TO POLLEN: ICD-10-CM

## 2023-05-24 LAB
EXPIRATION DATE: NORMAL
FLUAV AG UPPER RESP QL IA.RAPID: NOT DETECTED
FLUBV AG UPPER RESP QL IA.RAPID: NOT DETECTED
INTERNAL CONTROL: NORMAL
Lab: NORMAL
SARS-COV-2 AG UPPER RESP QL IA.RAPID: NOT DETECTED

## 2023-05-24 PROCEDURE — 87428 SARSCOV & INF VIR A&B AG IA: CPT | Performed by: NURSE PRACTITIONER

## 2023-05-24 PROCEDURE — 99213 OFFICE O/P EST LOW 20 MIN: CPT | Performed by: NURSE PRACTITIONER

## 2023-05-24 RX ORDER — ALBUTEROL SULFATE 90 UG/1
2 AEROSOL, METERED RESPIRATORY (INHALATION) EVERY 4 HOURS PRN
Qty: 18 G | Refills: 0 | Status: SHIPPED | OUTPATIENT
Start: 2023-05-24

## 2023-05-24 RX ORDER — AMOXICILLIN AND CLAVULANATE POTASSIUM 875; 125 MG/1; MG/1
1 TABLET, FILM COATED ORAL 2 TIMES DAILY
Qty: 20 TABLET | Refills: 0 | Status: SHIPPED | OUTPATIENT
Start: 2023-05-24

## 2023-05-24 RX ORDER — BENZONATATE 100 MG/1
100 CAPSULE ORAL 3 TIMES DAILY PRN
Qty: 30 CAPSULE | Refills: 0 | Status: SHIPPED | OUTPATIENT
Start: 2023-05-24

## 2023-05-24 NOTE — PROGRESS NOTES
Chief Complaint  Cough (Power washed his house on Sunday and started showing symptoms after that. Has increased in severity ever since. ), Fever, and Nasal Congestion    Subjective          Tommie Barron presents to [facility] for sinus symptoms and cough    History of Present Illness    Patient reports she started having some sinus symptoms and congestion after power washing his house over the weekend.  It now has progressed into a cough and congestion.  He describes coughing last night continually.  He does not feel like he was wheezing.  He has been taking Mucinex cold and sinus over-the-counter.  He is now having productive sputum which he has not looked at.  He has a great deal of ear pressure and congestion.  Some sinus pressure as well.  He has not been running by sick that he is aware of.  Last night he had temperature of 101.  Afebrile this morning with no medication    Tommie Barron  has a past medical history of Anxiety, Cough, Depressive disorder, GERD (gastroesophageal reflux disease), Hidradenitis, and Left-sided Bell's palsy.      Review of Systems   Constitutional: Positive for fatigue and fever. Negative for chills.   HENT: Positive for ear pain, postnasal drip and rhinorrhea. Negative for congestion, ear discharge, facial swelling, hearing loss, sinus pain, sore throat and trouble swallowing.    Eyes: Negative for visual disturbance.   Respiratory: Positive for cough. Negative for shortness of breath.    Cardiovascular: Negative for chest pain.   Gastrointestinal: Negative for abdominal pain, constipation, diarrhea, nausea and vomiting.        Objective       Current Outpatient Medications:   •  Omeprazole 20 MG tablet delayed-release, Take 20 mg by mouth Daily., Disp: , Rfl:   •  QUEtiapine XR (SEROquel XR) 150 MG 24 hr tablet, Take 1 tablet by mouth Daily., Disp: 30 tablet, Rfl: 11  •  albuterol sulfate  (90 Base) MCG/ACT inhaler, Inhale 2 puffs Every 4  "(Four) Hours As Needed for Wheezing., Disp: 18 g, Rfl: 0  •  amoxicillin-clavulanate (AUGMENTIN) 875-125 MG per tablet, Take 1 tablet by mouth 2 (Two) Times a Day., Disp: 20 tablet, Rfl: 0  •  benzonatate (TESSALON) 100 MG capsule, Take 1 capsule by mouth 3 (Three) Times a Day As Needed for Cough., Disp: 30 capsule, Rfl: 0    Vital Signs:      /79 (BP Location: Left arm, Patient Position: Sitting, Cuff Size: Large Adult)   Pulse 99   Temp 98.7 °F (37.1 °C) (Infrared)   Resp 16   Ht 176.5 cm (69.5\")   Wt 115 kg (254 lb)   SpO2 95%   BMI 36.97 kg/m²     Vitals:    05/24/23 1049   BP: 115/79   BP Location: Left arm   Patient Position: Sitting   Cuff Size: Large Adult   Pulse: 99   Resp: 16   Temp: 98.7 °F (37.1 °C)   TempSrc: Infrared   SpO2: 95%   Weight: 115 kg (254 lb)   Height: 176.5 cm (69.5\")      Physical Exam  Vitals and nursing note reviewed.   Constitutional:       General: He is not in acute distress.     Appearance: Normal appearance. He is well-developed and normal weight.   HENT:      Head: Normocephalic and atraumatic.      Right Ear: Tympanic membrane, ear canal and external ear normal.      Left Ear: Ear canal and external ear normal. Tympanic membrane is erythematous and bulging.      Ears:      Comments: Bilateral air-fluid level  Scarring bilateral TMs     Nose: Nose normal.      Right Sinus: No maxillary sinus tenderness or frontal sinus tenderness.      Left Sinus: No maxillary sinus tenderness or frontal sinus tenderness.      Mouth/Throat:      Mouth: Mucous membranes are moist.      Dentition: Normal dentition.      Tongue: No lesions.      Pharynx: Uvula midline.      Comments: Postnasal discharge  Eyes:      Conjunctiva/sclera: Conjunctivae normal.      Pupils: Pupils are equal, round, and reactive to light.   Cardiovascular:      Rate and Rhythm: Regular rhythm.      Heart sounds: Normal heart sounds. No murmur heard.    No friction rub. No gallop.   Pulmonary:      Effort: " Pulmonary effort is normal.      Breath sounds: Normal breath sounds. No wheezing or rhonchi.   Abdominal:      General: Bowel sounds are normal. There is no distension.      Palpations: Abdomen is soft.      Tenderness: There is no abdominal tenderness.   Musculoskeletal:         General: Normal range of motion.      Cervical back: Normal range of motion and neck supple.   Skin:     General: Skin is warm and dry.   Neurological:      General: No focal deficit present.      Mental Status: He is alert.   Psychiatric:         Mood and Affect: Mood normal.         Behavior: Behavior normal.        Result Review :                  PHQ-9 Total Score:             Assessment and Plan    Diagnoses and all orders for this visit:    1. Bronchitis (Primary)  Comments:  Albuterol inhaler with instructions    2. Body mass index (BMI) of 36.0 to 36.9 in adult    3. Acute cough  Comments:  Flu and COVID are negative.  Tessalon Perle [facility]  Orders:  -     POCT SARS-CoV-2 Antigen SHAWNA    4. Seasonal allergic rhinitis due to pollen  Comments:  Does not normally take allergy medicine.  Take an over-the-counter medicine now    5. Non-recurrent acute serous otitis media of left ear  Comments:  Antibiotic prescription    Other orders  -     amoxicillin-clavulanate (AUGMENTIN) 875-125 MG per tablet; Take 1 tablet by mouth 2 (Two) Times a Day.  Dispense: 20 tablet; Refill: 0  -     albuterol sulfate  (90 Base) MCG/ACT inhaler; Inhale 2 puffs Every 4 (Four) Hours As Needed for Wheezing.  Dispense: 18 g; Refill: 0  -     benzonatate (TESSALON) 100 MG capsule; Take 1 capsule by mouth 3 (Three) Times a Day As Needed for Cough.  Dispense: 30 capsule; Refill: 0         Problem List Items Addressed This Visit        Active Problems    Cough    Relevant Orders    POCT SARS-CoV-2 Antigen SHAWNA    Body mass index (BMI) of 36.0 to 36.9 in adult   Other Visit Diagnoses     Bronchitis    -  Primary    Albuterol inhaler with instructions    Relevant  Medications    albuterol sulfate  (90 Base) MCG/ACT inhaler    benzonatate (TESSALON) 100 MG capsule    Seasonal allergic rhinitis due to pollen        Does not normally take allergy medicine.  Take an over-the-counter medicine now    Non-recurrent acute serous otitis media of left ear        Antibiotic prescription          Follow Up   No follow-ups on file.  Patient was given instructions and counseling regarding his condition or for health maintenance advice. Please see specific information pulled into the AVS if appropriate.

## 2023-05-24 NOTE — TELEPHONE ENCOUNTER
Patient has has made a same-day appointment for 11:00.  There is no reason for visit.  Please call him to find out what he is coming in for.

## 2023-07-28 ENCOUNTER — OFFICE VISIT (OUTPATIENT)
Dept: FAMILY MEDICINE CLINIC | Facility: CLINIC | Age: 43
End: 2023-07-28
Payer: COMMERCIAL

## 2023-07-28 VITALS
HEIGHT: 69 IN | SYSTOLIC BLOOD PRESSURE: 137 MMHG | TEMPERATURE: 98.4 F | WEIGHT: 256.8 LBS | BODY MASS INDEX: 38.03 KG/M2 | OXYGEN SATURATION: 96 % | HEART RATE: 79 BPM | RESPIRATION RATE: 18 BRPM | DIASTOLIC BLOOD PRESSURE: 96 MMHG

## 2023-07-28 DIAGNOSIS — H92.02 EAR PAIN, LEFT: ICD-10-CM

## 2023-07-28 DIAGNOSIS — J02.9 SORE THROAT: ICD-10-CM

## 2023-07-28 DIAGNOSIS — H66.002 ACUTE SUPPURATIVE OTITIS MEDIA OF LEFT EAR WITHOUT SPONTANEOUS RUPTURE OF TYMPANIC MEMBRANE, RECURRENCE NOT SPECIFIED: Primary | ICD-10-CM

## 2023-07-28 DIAGNOSIS — H60.502 ACUTE OTITIS EXTERNA OF LEFT EAR, UNSPECIFIED TYPE: ICD-10-CM

## 2023-07-28 LAB
EXPIRATION DATE: NORMAL
INTERNAL CONTROL: NORMAL
Lab: NORMAL
S PYO AG THROAT QL: NEGATIVE

## 2023-07-28 RX ORDER — AMOXICILLIN 500 MG/1
500 CAPSULE ORAL 3 TIMES DAILY
COMMUNITY
End: 2023-07-28

## 2023-07-28 RX ORDER — IBUPROFEN 800 MG/1
800 TABLET ORAL EVERY 8 HOURS PRN
Qty: 30 TABLET | Refills: 0 | Status: SHIPPED | OUTPATIENT
Start: 2023-07-28 | End: 2023-08-07

## 2023-07-28 RX ORDER — CEFTRIAXONE 1 G/1
1 INJECTION, POWDER, FOR SOLUTION INTRAMUSCULAR; INTRAVENOUS ONCE
Status: COMPLETED | OUTPATIENT
Start: 2023-07-28 | End: 2023-07-28

## 2023-07-28 RX ORDER — NEOMYCIN SULFATE, POLYMYXIN B SULFATE AND HYDROCORTISONE 10; 3.5; 1 MG/ML; MG/ML; [USP'U]/ML
2 SUSPENSION/ DROPS AURICULAR (OTIC) 4 TIMES DAILY
COMMUNITY
Start: 2023-07-26 | End: 2023-08-02

## 2023-07-28 RX ORDER — METHYLPREDNISOLONE SODIUM SUCCINATE 125 MG/2ML
125 INJECTION, POWDER, LYOPHILIZED, FOR SOLUTION INTRAMUSCULAR; INTRAVENOUS ONCE
Status: COMPLETED | OUTPATIENT
Start: 2023-07-28 | End: 2023-07-28

## 2023-07-28 RX ORDER — CEFDINIR 300 MG/1
300 CAPSULE ORAL EVERY 12 HOURS
Qty: 20 CAPSULE | Refills: 0 | Status: SHIPPED | OUTPATIENT
Start: 2023-07-28 | End: 2023-08-07

## 2023-07-28 RX ADMIN — CEFTRIAXONE 1 G: 1 INJECTION, POWDER, FOR SOLUTION INTRAMUSCULAR; INTRAVENOUS at 18:14

## 2023-07-28 RX ADMIN — METHYLPREDNISOLONE SODIUM SUCCINATE 125 MG: 125 INJECTION, POWDER, LYOPHILIZED, FOR SOLUTION INTRAMUSCULAR; INTRAVENOUS at 18:15

## 2023-07-28 NOTE — PROGRESS NOTES
Chief Complaint  Earache (Seen at Urgent Care Wednesday. States pain is worse.  Down in jaw.  States woke up with sore throat.  Muffled hearing in ear )    Anne Reilly is a 43 y.o. male  who presents to [facility]     Patient Care Team:  Checo Mckinley MD as PCP - General     History of Present Illness  Tommie is here today for left ear pain    Location: Left ear pain.    Quality: throbbing  Severity: moderate to severe  Duration: for 3 days  Timing: constant, getting worse  Context: He went to [facility] on 7/26/23  Alleviating factors: ibuprofen helps some  Aggravating factors: moving left pinna  Associated Symptoms: + fever yesterday, + hearing muffled, + sore throat    He went to [facility] on Wed 7/26/23 for throbbing left ear pain.  He was diagnosed with left OM and left OE.  He was prescribed Amoxil 500 mg TID and Cortisporin 2 drops QID.    The pain became worse the next morning on 7/27/23  The pain has spread into jaw.  Fever yesterday  He woke up this AM with a sore throat.    No ill contacts.      Tommie Barron  has a past medical history of Anxiety, Cough, Depressive disorder, GERD (gastroesophageal reflux disease), Hidradenitis, and Left-sided Bell's palsy.      Review of Systems   Constitutional:  Positive for fever (yesterday).   HENT:  Positive for ear discharge, ear pain, facial swelling, hearing loss (decreased hearing left), sore throat and tinnitus. Negative for congestion and trouble swallowing.    Eyes:  Negative for visual disturbance.   Respiratory:  Negative for cough and shortness of breath.    Cardiovascular:  Negative for chest pain, palpitations and leg swelling.   Gastrointestinal:  Positive for nausea. Negative for abdominal pain and vomiting.   Skin:  Negative for rash.      Family History   Problem Relation Age of Onset    Diabetes Other     Prostate cancer Other         Past Surgical History:   Procedure  "Laterality Date    KNEE ARTHROSCOPY Right 2006    Torn Meniscus repair    VASECTOMY  01/16/2017    In-office        Social History     Socioeconomic History    Marital status:    Tobacco Use    Smoking status: Every Day     Packs/day: 1.00     Years: 15.00     Pack years: 15.00     Types: Cigarettes    Smokeless tobacco: Former     Types: Chew   Vaping Use    Vaping Use: Never used   Substance and Sexual Activity    Alcohol use: Not Currently    Drug use: Never    Sexual activity: Yes     Partners: Female     Birth control/protection: Surgical        Immunization History   Administered Date(s) Administered    COVID-19 (MODERNA) 1st,2nd,3rd Dose Monovalent 03/24/2021, 04/29/2021    MMR 08/05/1997    Td (TDVAX) 08/05/1997, 01/01/2006       Objective       Current Outpatient Medications:     albuterol sulfate  (90 Base) MCG/ACT inhaler, Inhale 2 puffs Every 4 (Four) Hours As Needed for Wheezing., Disp: 18 g, Rfl: 0    neomycin-polymyxin-hydrocortisone (CORTISPORIN) 3.5-46070-6 otic suspension, Administer 2 drops into the left ear 4 (Four) Times a Day., Disp: , Rfl:     Omeprazole 20 MG tablet delayed-release, Take 20 mg by mouth Daily., Disp: , Rfl:     QUEtiapine XR (SEROquel XR) 150 MG 24 hr tablet, Take 1 tablet by mouth Daily., Disp: 30 tablet, Rfl: 11    cefdinir (OMNICEF) 300 MG capsule, Take 1 capsule by mouth Every 12 (Twelve) Hours for 10 days., Disp: 20 capsule, Rfl: 0    ibuprofen (ADVIL,MOTRIN) 800 MG tablet, Take 1 tablet by mouth Every 8 (Eight) Hours As Needed for Mild Pain for up to 10 days. Take with food, Disp: 30 tablet, Rfl: 0  No current facility-administered medications for this visit.    Vital Signs:      /96   Pulse 79   Temp 98.4 °F (36.9 °C) (Infrared)   Resp 18   Ht 176.5 cm (69.49\")   Wt 116 kg (256 lb 12.8 oz)   SpO2 96%   BMI 37.39 kg/m²     Vitals:    07/28/23 1616   BP: 137/96   Pulse: 79   Resp: 18   Temp: 98.4 °F (36.9 °C)   TempSrc: Infrared   SpO2: 96% " "  Weight: 116 kg (256 lb 12.8 oz)   Height: 176.5 cm (69.49\")   PainSc:   8   PainLoc: Ear      Physical Exam  Vitals reviewed.   Constitutional:       General: He is not in acute distress.     Appearance: Normal appearance. He is obese. He is not toxic-appearing or diaphoretic.   HENT:      Head: Normocephalic and atraumatic.      Right Ear: Tympanic membrane, ear canal and external ear normal.      Left Ear: External ear normal. Decreased hearing noted. Drainage, swelling and tenderness present. No mastoid tenderness.      Ears:      Comments: Left ear canal edematous.  Purulent drainage in left ear canal and unable to visualize TM.  Pain with manipulation of left pinna.  Tender to touch over left pre-auricular area.     Nose: Nose normal.      Mouth/Throat:      Mouth: Mucous membranes are moist.      Pharynx: Oropharynx is clear. Uvula midline. Posterior oropharyngeal erythema present. No oropharyngeal exudate or uvula swelling.      Tonsils: No tonsillar exudate or tonsillar abscesses.   Eyes:      General: No scleral icterus.     Conjunctiva/sclera: Conjunctivae normal.   Cardiovascular:      Rate and Rhythm: Normal rate and regular rhythm.      Heart sounds: Normal heart sounds.   Pulmonary:      Effort: Pulmonary effort is normal. No respiratory distress.      Breath sounds: Normal breath sounds. No wheezing.   Musculoskeletal:      Cervical back: Neck supple.      Right lower leg: No edema.      Left lower leg: No edema.   Lymphadenopathy:      Cervical: Cervical adenopathy present.      Right cervical: No superficial cervical adenopathy.     Left cervical: Superficial cervical adenopathy present. No deep or posterior cervical adenopathy.   Skin:     General: Skin is warm and dry.   Neurological:      Mental Status: He is alert and oriented to person, place, and time.   Psychiatric:         Mood and Affect: Mood normal.      Result Review :   The following data was reviewed by: CHRIS Goff on " 07/28/2023:    Data reviewed : [facility] note on 7/26/2023          Office Visit on 07/28/2023   Component Date Value Ref Range Status    Rapid Strep A Screen 07/28/2023 Negative  Negative, VALID, INVALID, Not Performed Final    Internal Control 07/28/2023 Passed  Passed Final    Lot Number 07/28/2023 na   Final    Expiration Date 07/28/2023 na   Final            Assessment and Plan    Diagnoses and all orders for this visit:    1. Acute suppurative otitis media of left ear without spontaneous rupture of tympanic membrane, recurrence not specified (Primary)  -     cefTRIAXone (ROCEPHIN) injection 1 g  -     cefdinir (OMNICEF) 300 MG capsule; Take 1 capsule by mouth Every 12 (Twelve) Hours for 10 days.  Dispense: 20 capsule; Refill: 0    2. Acute otitis externa of left ear, unspecified type  -     methylPREDNISolone sodium succinate (SOLU-Medrol) injection 125 mg    3. Sore throat  -     POC Rapid Strep A    4. Ear pain, left  -     ibuprofen (ADVIL,MOTRIN) 800 MG tablet; Take 1 tablet by mouth Every 8 (Eight) Hours As Needed for Mild Pain for up to 10 days. Take with food  Dispense: 30 tablet; Refill: 0       Rocephin 1 gram IM and Solu Medrol 125 mg IM given in office.     Stop amoxil.  Begin omnicef 300 mg BID for 10 days.     Continue Cortisporin 2 drops QID to left ear    Take ibuprofen 800 mg every 8 hours as needed for pain and/or fever.    Advised to go to the ER if no improvement or for worsening symptoms over the weekend.  Follow up for recheck with PCP Dr. Mckinley next week or sooner if needed.         Follow Up   Return in about 1 week (around 8/4/2023) for Recheck with PCP Dr. Mckinley next week or sooner if needed. .  Patient was given instructions and counseling regarding his condition or for health maintenance advice. Please see specific information pulled into the AVS if appropriate.    There are no Patient Instructions on file for this visit.

## 2023-10-02 ENCOUNTER — OFFICE VISIT (OUTPATIENT)
Dept: FAMILY MEDICINE CLINIC | Facility: CLINIC | Age: 43
End: 2023-10-02
Payer: COMMERCIAL

## 2023-10-02 VITALS
HEIGHT: 69 IN | TEMPERATURE: 98.2 F | DIASTOLIC BLOOD PRESSURE: 90 MMHG | SYSTOLIC BLOOD PRESSURE: 152 MMHG | HEART RATE: 104 BPM | OXYGEN SATURATION: 96 % | RESPIRATION RATE: 18 BRPM | BODY MASS INDEX: 38.21 KG/M2 | WEIGHT: 258 LBS

## 2023-10-02 DIAGNOSIS — B36.9 OTOMYCOSIS OF BOTH EARS: Primary | ICD-10-CM

## 2023-10-02 DIAGNOSIS — H62.43 OTOMYCOSIS OF BOTH EARS: Primary | ICD-10-CM

## 2023-10-02 PROBLEM — H92.09 EAR PAIN: Status: ACTIVE | Noted: 2023-10-02

## 2023-10-02 PROCEDURE — 99214 OFFICE O/P EST MOD 30 MIN: CPT | Performed by: FAMILY MEDICINE

## 2023-10-02 RX ORDER — CLOTRIMAZOLE 1 G/ML
SOLUTION TOPICAL
COMMUNITY
Start: 2023-08-01 | End: 2023-10-02 | Stop reason: SDUPTHER

## 2023-10-02 RX ORDER — CLOTRIMAZOLE 1 G/ML
SOLUTION TOPICAL
Qty: 10 ML | Refills: 0 | Status: SHIPPED | OUTPATIENT
Start: 2023-10-02

## 2023-10-02 RX ORDER — FLUCONAZOLE 150 MG/1
150 TABLET ORAL DAILY
Qty: 1 TABLET | Refills: 0 | Status: SHIPPED | OUTPATIENT
Start: 2023-10-02 | End: 2023-10-03

## 2023-10-02 NOTE — ASSESSMENT & PLAN NOTE
Patient's (Body mass index is 38.1 kg/m².) indicates that they are morbidly/severely obese (BMI > 40 or > 35 with obesity - related health condition) with health conditions that include GERD . Weight is worsening. BMI  is above average; BMI management plan is completed. We discussed portion control and increasing exercise.

## 2023-10-02 NOTE — PROGRESS NOTES
Chief Complaint   Patient presents with    Earache    Hand Pain       Subjective   Tommie Barron is a 43 y.o. male.     Earache   There is pain in both ears. This is a recurrent problem. The current episode started more than 1 month ago. The problem occurs constantly. The problem has been gradually worsening. There has been no fever. The pain is at a severity of 4/10. The pain is mild. Associated symptoms include ear discharge. He has tried ear drops, NSAIDs and acetaminophen for the symptoms. The treatment provided mild relief.      Patient reports that this is an ongoing problem.  Symptoms started months ago.  He was treated with antibiotics and then had a consultation with [facility], who diagnosed his infection as fungal.  Records are not available for review today.  He was started on an ear drop.  The ear drop does work for awhile but then symptoms returned.  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  Patient called his ENT today to get an appointment, but they could not see him for at least 3 days.  He still has some of the drops at home.    Finger Pain  Patient states they are having pain and swelling in his ring finger on his left hand. This began a couple weeks ago after shoveling rock outside for about an hour.  He is right handed.  The finger is swollen and there is some reduced flexion of this digit.  No erythema.    Past Medical History :  Active Ambulatory Problems     Diagnosis Date Noted    Anxiety 03/03/2020    Depressive disorder 03/03/2020    Osteoarthrosis 03/03/2020    Encounter for annual general medical examination with abnormal findings in adult 03/09/2021    Cough     GERD (gastroesophageal reflux disease)     Class 2 severe obesity due to excess calories with serious comorbidity and body mass index (BMI) of 38.0 to 38.9 in adult 03/09/2021    Tobacco use 02/14/2023    Body mass index (BMI) of 36.0 to 36.9 in adult 05/24/2023    Ear pain 10/02/2023     Resolved  "Ambulatory Problems     Diagnosis Date Noted    Over weight 03/09/2021    Hidradenitis     Left-sided Bell's palsy      No Additional Past Medical History       Medication List:    Current Outpatient Medications:     Omeprazole 20 MG tablet delayed-release, Take 20 mg by mouth Daily., Disp: , Rfl:     QUEtiapine XR (SEROquel XR) 150 MG 24 hr tablet, Take 1 tablet by mouth Daily., Disp: 30 tablet, Rfl: 11    albuterol sulfate  (90 Base) MCG/ACT inhaler, Inhale 2 puffs Every 4 (Four) Hours As Needed for Wheezing. (Patient not taking: Reported on 10/2/2023), Disp: 18 g, Rfl: 0    No Known Allergies    Social History     Tobacco Use    Smoking status: Every Day     Packs/day: 1.00     Years: 15.00     Pack years: 15.00     Types: Cigarettes    Smokeless tobacco: Former     Types: Chew   Substance Use Topics    Alcohol use: Not Currently       Review of Systems   HENT:  Positive for ear discharge and ear pain.        Objective   Vitals:    10/02/23 0959   BP: 152/90   BP Location: Left arm   Patient Position: Sitting   Cuff Size: Large Adult   Pulse: 104   Resp: 18   Temp: 98.2 °F (36.8 °C)   TempSrc: Temporal   SpO2: 96%   Weight: 117 kg (258 lb)   Height: 175.3 cm (69\")     Body mass index is 38.1 kg/m².    Physical Exam  Constitutional:       General: He is not in acute distress.     Appearance: He is well-developed.   HENT:      Head: Normocephalic and atraumatic.      Right Ear: External ear normal. Drainage present. There is no impacted cerumen. No foreign body. Tympanic membrane is not injected or erythematous.      Left Ear: External ear normal. Drainage present. There is no impacted cerumen. No foreign body. Tympanic membrane is not injected or erythematous.      Ears:      Comments: Bilateral acute otitis externa with white debris in the canal  Eyes:      General: No scleral icterus.     Conjunctiva/sclera: Conjunctivae normal.   Pulmonary:      Effort: Pulmonary effort is normal.   Neurological:      " Mental Status: He is alert and oriented to person, place, and time.   Psychiatric:         Behavior: Behavior normal.         Thought Content: Thought content normal.         Judgment: Judgment normal.     Assessment & Plan     Diagnoses and all orders for this visit:    1. Otomycosis of both ears (Primary)  -     clotrimazole (LOTRIMIN) 1 % external solution; 4-5 drops in both ears twice daily for 10 days.  Dispense: 10 mL; Refill: 0  -     fluconazole (Diflucan) 150 MG tablet; Take 1 tablet by mouth Daily for 1 day.  Dispense: 1 tablet; Refill: 0    Recurrent/unresolved fungal AOM of the bilateral canals.  Refill clotrimazole drops.  One time dose of fluconazole to expedite resolution.  He was encouraged to reach out to his ENT after this appointment to schedule a follow-up visit for re-evaluation and debridement/cleaning if needed.    Records being requested.    Return if symptoms worsen or fail to improve.       Patient was given instructions and counseling regarding his/her condition or for health maintenance advice. Please see specific information pulled into the AVS if appropriate.

## 2024-02-02 ENCOUNTER — OFFICE VISIT (OUTPATIENT)
Dept: FAMILY MEDICINE CLINIC | Facility: CLINIC | Age: 44
End: 2024-02-02
Payer: COMMERCIAL

## 2024-02-02 ENCOUNTER — HOSPITAL ENCOUNTER (OUTPATIENT)
Dept: GENERAL RADIOLOGY | Facility: HOSPITAL | Age: 44
Discharge: HOME | End: 2024-02-02
Admitting: FAMILY MEDICINE
Payer: COMMERCIAL

## 2024-02-02 VITALS
BODY MASS INDEX: 37.47 KG/M2 | WEIGHT: 253 LBS | SYSTOLIC BLOOD PRESSURE: 124 MMHG | HEIGHT: 69 IN | HEART RATE: 90 BPM | TEMPERATURE: 98.2 F | OXYGEN SATURATION: 98 % | DIASTOLIC BLOOD PRESSURE: 86 MMHG | RESPIRATION RATE: 20 BRPM

## 2024-02-02 DIAGNOSIS — J22 LOWER RESPIRATORY INFECTION: ICD-10-CM

## 2024-02-02 DIAGNOSIS — R05.1 ACUTE COUGH: Primary | ICD-10-CM

## 2024-02-02 PROCEDURE — 99213 OFFICE O/P EST LOW 20 MIN: CPT | Performed by: FAMILY MEDICINE

## 2024-02-02 PROCEDURE — 71046 X-RAY EXAM CHEST 2 VIEWS: CPT

## 2024-02-02 RX ORDER — LEVOFLOXACIN 500 MG/1
500 TABLET, FILM COATED ORAL DAILY
Qty: 10 TABLET | Refills: 0 | Status: SHIPPED | OUTPATIENT
Start: 2024-02-02

## 2024-02-02 NOTE — PROGRESS NOTES
Subjective   Tommie Barron is a 43 y.o. male.   Chief Complaint   Patient presents with    URI       History of Present Illness  Cough productive of blood streaked sputum  URI   This is a new problem. The current episode started 1 to 4 weeks ago. The problem has been unchanged. The maximum temperature recorded prior to his arrival was 100.4 - 100.9 F. The fever has been present for Less than 1 day. Associated symptoms include congestion, coughing, ear pain, headaches and joint pain. Pertinent negatives include no rash. He has tried acetaminophen, decongestant and antihistamine for the symptoms. The treatment provided no relief.        The following portions of the patient's history were reviewed and updated as appropriate: allergies, current medications, past family history, past medical history, past social history, past surgical history, and problem list.    Patient Active Problem List   Diagnosis    Anxiety    Depressive disorder    Osteoarthrosis    Encounter for annual general medical examination with abnormal findings in adult    Cough    GERD (gastroesophageal reflux disease)    Class 2 severe obesity due to excess calories with serious comorbidity and body mass index (BMI) of 38.0 to 38.9 in adult    Tobacco use    Body mass index (BMI) of 36.0 to 36.9 in adult    Ear pain       Current Outpatient Medications on File Prior to Visit   Medication Sig Dispense Refill    Omeprazole 20 MG tablet delayed-release Take 20 mg by mouth Daily.      QUEtiapine XR (SEROquel XR) 150 MG 24 hr tablet Take 1 tablet by mouth Daily. 30 tablet 11    [DISCONTINUED] albuterol sulfate  (90 Base) MCG/ACT inhaler Inhale 2 puffs Every 4 (Four) Hours As Needed for Wheezing. (Patient not taking: Reported on 10/2/2023) 18 g 0    [DISCONTINUED] clotrimazole (LOTRIMIN) 1 % external solution 4-5 drops in both ears twice daily for 10 days. 10 mL 0     No current facility-administered medications on file prior to visit.     Current  "outpatient and discharge medications have been reconciled for the patient.  Reviewed by: Checo Mckinley MD      No Known Allergies    Review of Systems   Constitutional:  Positive for fever.   HENT:  Positive for congestion and ear pain.    Respiratory:  Positive for cough.    Musculoskeletal:  Positive for joint pain.   Skin:  Negative for rash.   All other systems reviewed and are negative.    I have reviewed and confirmed the accuracy of the ROS as documented by the MA/LPN/RN Checo Mckinley MD    Objective   Visit Vitals  /86 (BP Location: Right arm, Patient Position: Sitting, Cuff Size: Large Adult)   Pulse 90   Temp 98.2 °F (36.8 °C) (Temporal)   Resp 20   Ht 175.3 cm (69.02\")   Wt 115 kg (253 lb)   SpO2 98%   BMI 37.34 kg/m²      **  Physical Exam  Constitutional:       Appearance: He is well-developed.   HENT:      Head: Normocephalic and atraumatic.      Right Ear: External ear normal.      Left Ear: External ear normal.      Nose: Nose normal.   Eyes:      Pupils: Pupils are equal, round, and reactive to light.   Cardiovascular:      Rate and Rhythm: Normal rate and regular rhythm.      Heart sounds: Normal heart sounds.   Pulmonary:      Effort: Pulmonary effort is normal.      Breath sounds: Rhonchi present.   Abdominal:      General: Bowel sounds are normal.      Palpations: Abdomen is soft.   Musculoskeletal:         General: Normal range of motion.      Cervical back: Normal range of motion and neck supple.   Skin:     General: Skin is warm and dry.   Neurological:      Mental Status: He is alert and oriented to person, place, and time.   Psychiatric:         Behavior: Behavior normal.         Thought Content: Thought content normal.         Judgment: Judgment normal.       Derm Physical Exam  Ortho Exam   Neurologic Exam     Mental Status   Oriented to person, place, and time.     Cranial Nerves     CN III, IV, VI   Pupils are equal, round, and reactive to light.       Diagnoses " and all orders for this visit:    1. Acute cough (Primary)  -     XR Chest PA & Lateral    2. Lower respiratory infection  -     levoFLOXacin (Levaquin) 500 MG tablet; Take 1 tablet by mouth Daily.  Dispense: 10 tablet; Refill: 0    Findings discussed. All questions answered.  Medication and medication adverse effects discussed.  Drug education given and explained to patient. Patient verbalized understanding.  Differential diagnosis discussed.   Follow-up in approximately 3 days for reevaluation if not improved.  Follow-up sooner for worsening symptoms or any concerns.   Mucinex ud.  Increase fluids, rest            Expected course, medications, and adverse effects discussed as appropriate.  Call or return if worsening or persistent symptoms.     This document is intended for medical professional use only.

## 2024-02-12 DIAGNOSIS — F41.9 ANXIETY: ICD-10-CM

## 2024-02-12 DIAGNOSIS — F32.A DEPRESSIVE DISORDER: ICD-10-CM

## 2024-02-12 RX ORDER — QUETIAPINE 150 MG/1
150 TABLET, FILM COATED, EXTENDED RELEASE ORAL DAILY
Qty: 90 TABLET | Refills: 3 | Status: SHIPPED | OUTPATIENT
Start: 2024-02-12

## 2024-03-11 ENCOUNTER — OFFICE VISIT (OUTPATIENT)
Dept: FAMILY MEDICINE CLINIC | Facility: CLINIC | Age: 44
End: 2024-03-11
Payer: COMMERCIAL

## 2024-03-11 VITALS
SYSTOLIC BLOOD PRESSURE: 128 MMHG | WEIGHT: 254 LBS | OXYGEN SATURATION: 98 % | HEIGHT: 69 IN | BODY MASS INDEX: 37.62 KG/M2 | DIASTOLIC BLOOD PRESSURE: 82 MMHG | RESPIRATION RATE: 20 BRPM | HEART RATE: 80 BPM

## 2024-03-11 DIAGNOSIS — H60.63 CHRONIC OTITIS EXTERNA OF BOTH EARS, UNSPECIFIED TYPE: Primary | ICD-10-CM

## 2024-03-11 PROCEDURE — 99213 OFFICE O/P EST LOW 20 MIN: CPT | Performed by: FAMILY MEDICINE

## 2024-03-11 NOTE — PROGRESS NOTES
Subjective   Tommie Barron is a 43 y.o. male.   Chief Complaint   Patient presents with    Earache       Earache   There is pain in both ears. This is a recurrent problem. The current episode started 1 to 4 weeks ago. The problem occurs constantly. The problem has been unchanged. There has been no fever. The pain is at a severity of 2/10. The pain is moderate. Associated symptoms include drainage and hearing loss. He has tried acetaminophen and antibiotics for the symptoms. The treatment provided no relief. His past medical history is significant for a chronic ear infection.        The following portions of the patient's history were reviewed and updated as appropriate: allergies, current medications, past family history, past medical history, past social history, past surgical history, and problem list.    Patient Active Problem List   Diagnosis    Anxiety    Depressive disorder    Osteoarthrosis    Encounter for annual general medical examination with abnormal findings in adult    Cough    GERD (gastroesophageal reflux disease)    Class 2 severe obesity due to excess calories with serious comorbidity and body mass index (BMI) of 38.0 to 38.9 in adult    Tobacco use    Body mass index (BMI) of 36.0 to 36.9 in adult    Ear pain       Current Outpatient Medications on File Prior to Visit   Medication Sig Dispense Refill    Omeprazole 20 MG tablet delayed-release Take 20 mg by mouth Daily.      QUEtiapine XR (SEROquel XR) 150 MG 24 hr tablet TAKE 1 TABLET BY MOUTH EVERY DAY 90 tablet 3    [DISCONTINUED] levoFLOXacin (Levaquin) 500 MG tablet Take 1 tablet by mouth Daily. 10 tablet 0     No current facility-administered medications on file prior to visit.     Current outpatient and discharge medications have been reconciled for the patient.  Reviewed by: Checo Mckinley MD      No Known Allergies    Review of Systems   Constitutional:  Negative for fever.   HENT:  Positive for ear pain, hearing loss and tinnitus.  "     I have reviewed and confirmed the accuracy of the ROS as documented by the MA/LPN/RN Checo Mckinley MD    Objective   Visit Vitals  /82 (BP Location: Right arm, Patient Position: Sitting, Cuff Size: Large Adult)   Pulse 80   Resp 20   Ht 175.3 cm (69.02\")   Wt 115 kg (254 lb)   SpO2 98%   BMI 37.49 kg/m²      **  Physical Exam  Constitutional:       Appearance: He is well-developed.   HENT:      Head: Normocephalic and atraumatic.      Right Ear: External ear normal.      Left Ear: External ear normal.      Nose: Nose normal.   Eyes:      Pupils: Pupils are equal, round, and reactive to light.   Cardiovascular:      Rate and Rhythm: Normal rate and regular rhythm.      Heart sounds: Normal heart sounds.   Pulmonary:      Effort: Pulmonary effort is normal.      Breath sounds: Normal breath sounds.   Abdominal:      General: Bowel sounds are normal.      Palpations: Abdomen is soft.   Musculoskeletal:         General: Normal range of motion.      Cervical back: Normal range of motion and neck supple.   Skin:     General: Skin is warm and dry.   Neurological:      Mental Status: He is alert and oriented to person, place, and time.   Psychiatric:         Behavior: Behavior normal.         Thought Content: Thought content normal.         Judgment: Judgment normal.       Derm Physical Exam  Ortho Exam   Neurologic Exam     Mental Status   Oriented to person, place, and time.     Cranial Nerves     CN III, IV, VI   Pupils are equal, round, and reactive to light.         Diagnoses and all orders for this visit:    1. Chronic otitis externa of both ears, unspecified type (Primary)  -     neomycin-polymyxin-hydrocortisone (CORTISPORIN) 3.5-88665-0 otic solution; Administer 4 drops into both ears 4 (Four) Times a Day for 7 days.  Dispense: 10 mL; Refill: 0    Findings discussed. All questions answered.  Medication and medication adverse effects discussed.  Drug education given and explained to patient. " Patient verbalized understanding.  Follow-up in 4-6 weeks if not better.  Follow-up sooner for worsening symptoms or for any concerns.             Expected course, medications, and adverse effects discussed as appropriate.  Call or return if worsening or persistent symptoms.     This document is intended for medical professional use only.

## 2025-01-16 NOTE — PROGRESS NOTES
Subjective   Tommie Barron is a 44 y.o. male.   Chief Complaint   Patient presents with    Annual Exam    Depression     You are not authorized to view patient answers.     History of Present Illness  The patient is here: for coordination of medical care.  Patient has: moderate activity with work/home activities, good appetite, feels well with minor complaints, good energy level, and is sleeping well    COLORECTAL CANCER SCREENING Never done  Pneumococcal Vaccine 0-64(1 of 2 - PCV) Never done  TDAP/TD VACCINES(3 - Tdap) due on 01/01/2016  HEPATITIS C SCREENING Never done  ANNUAL PHYSICAL due on 02/14/2024  INFLUENZA VACCINE Never done  COVID-19 Vaccine(3 - 2024-25 season) due on 09/01/2024  BMI FOLLOWUP due on 10/02/2024  Current pain scale 0/10.     Doing well on seroquel.  Heartburn  He reports no abdominal pain, no chest pain, no nausea or no wheezing. This is a chronic problem. The problem has been unchanged. Pertinent negatives include no fatigue. He has tried a PPI (needs refill on prilosec) for the symptoms. The treatment provided significant relief.        The following portions of the patient's history were reviewed and updated as appropriate: allergies, current medications, past family history, past medical history, past social history, past surgical history, and problem list.    Patient Active Problem List   Diagnosis    Anxiety    Depressive disorder    Osteoarthrosis    Encounter for annual general medical examination with abnormal findings in adult    Cough    GERD (gastroesophageal reflux disease)    Class 2 severe obesity due to excess calories with serious comorbidity and body mass index (BMI) of 38.0 to 38.9 in adult    Tobacco use    Body mass index (BMI) of 36.0 to 36.9 in adult    Ear pain       Current Outpatient Medications on File Prior to Visit   Medication Sig Dispense Refill    [DISCONTINUED] Omeprazole 20 MG tablet delayed-release Take 20 mg by mouth Daily.      [DISCONTINUED] QUEtiapine  "XR (SEROquel XR) 150 MG 24 hr tablet TAKE 1 TABLET BY MOUTH EVERY DAY 90 tablet 3     No current facility-administered medications on file prior to visit.     Current outpatient and discharge medications have been reconciled for the patient.  Reviewed by: Checo Mckinley MD      No Known Allergies    Review of Systems   Constitutional:  Negative for activity change, appetite change, fatigue and fever.   HENT:  Negative for ear pain, swollen glands and voice change.    Eyes:  Negative for visual disturbance.   Respiratory:  Negative for shortness of breath and wheezing.    Cardiovascular:  Negative for chest pain and leg swelling.   Gastrointestinal:  Negative for abdominal pain, blood in stool, constipation, diarrhea, nausea and vomiting.   Endocrine: Negative for polydipsia and polyuria.   Genitourinary:  Negative for dysuria, frequency and hematuria.   Musculoskeletal:  Negative for joint swelling, neck pain and neck stiffness.   Skin:  Negative for rash and wound.   Neurological:  Negative for weakness, numbness and headache.   Psychiatric/Behavioral:  Negative for suicidal ideas and depressed mood.      I have reviewed and confirmed the accuracy of the ROS as documented by the MA/LPN/RN Checo Mckinley MD    Objective   Visit Vitals  /74 (BP Location: Right arm, Patient Position: Sitting, Cuff Size: Adult)   Pulse 71   Resp 20   Ht 175.3 cm (69.02\")   Wt 121 kg (266 lb)   SpO2 98%   BMI 39.26 kg/m²      **  Physical Exam  Constitutional:       Appearance: He is well-developed.   HENT:      Head: Normocephalic and atraumatic.      Right Ear: External ear normal.      Left Ear: External ear normal.      Nose: Nose normal.   Eyes:      Pupils: Pupils are equal, round, and reactive to light.   Cardiovascular:      Rate and Rhythm: Normal rate and regular rhythm.      Heart sounds: Normal heart sounds.   Pulmonary:      Effort: Pulmonary effort is normal.      Breath sounds: Normal breath sounds. "   Abdominal:      General: Bowel sounds are normal.      Palpations: Abdomen is soft.   Musculoskeletal:         General: Normal range of motion.      Cervical back: Normal range of motion and neck supple.   Skin:     General: Skin is warm and dry.   Neurological:      Mental Status: He is alert and oriented to person, place, and time.   Psychiatric:         Behavior: Behavior normal.         Thought Content: Thought content normal.         Judgment: Judgment normal.       Derm Physical Exam  Ortho Exam   Neurological Exam  Mental Status  Alert. Oriented to person, place, and time.    Cranial Nerves  CN III, IV, VI: Pupils equal round and reactive to light bilaterally.         Assessment & Plan  Encounter for wellness examination    Orders:    CBC & Differential    Comprehensive Metabolic Panel    TSH    Lipid Panel With / Chol / HDL Ratio    Colon cancer screening    Orders:    Ambulatory Referral to Colorectal Surgery    Depressive disorder  Doing well, in remission. Continue current management    Orders:    QUEtiapine XR (SEROquel XR) 150 MG 24 hr tablet; Take 1 tablet by mouth Daily.    Anxiety  Stable, continue current management.   Orders:    QUEtiapine XR (SEROquel XR) 150 MG 24 hr tablet; Take 1 tablet by mouth Daily.       Class 2 Severe Obesity (BMI >=35 and <=39.9). Obesity-related health conditions include the following: none. Obesity is unchanged. BMI is is above average; BMI management plan is completed. We discussed portion control and increasing exercise.  Discussed anticipatory guidance, diet, exercise, and weight loss.  Discussed safety and routine screening examinations.  Discussed self-examinations.  Tommie MINISTERIO Barron  reports that he has been smoking cigarettes. He has a 15 pack-year smoking history. He has never been exposed to tobacco smoke. He has quit using smokeless tobacco.  His smokeless tobacco use included chew. Follow-up for routine health maintenance as indicated.      Expected course,  medications, and adverse effects discussed as appropriate.  Call or return if worsening or persistent symptoms.     This document is intended for medical professional use only.   Electronically signed by Checo Mckinley MD on 01/17/2025. This content may not have been proofread.

## 2025-01-17 ENCOUNTER — OFFICE VISIT (OUTPATIENT)
Dept: FAMILY MEDICINE CLINIC | Facility: CLINIC | Age: 45
End: 2025-01-17
Payer: COMMERCIAL

## 2025-01-17 VITALS
BODY MASS INDEX: 39.4 KG/M2 | OXYGEN SATURATION: 98 % | HEIGHT: 69 IN | SYSTOLIC BLOOD PRESSURE: 122 MMHG | DIASTOLIC BLOOD PRESSURE: 74 MMHG | RESPIRATION RATE: 20 BRPM | WEIGHT: 266 LBS | HEART RATE: 71 BPM

## 2025-01-17 DIAGNOSIS — F41.9 ANXIETY: ICD-10-CM

## 2025-01-17 DIAGNOSIS — F32.A DEPRESSIVE DISORDER: ICD-10-CM

## 2025-01-17 DIAGNOSIS — Z00.00 ENCOUNTER FOR WELLNESS EXAMINATION: Primary | ICD-10-CM

## 2025-01-17 DIAGNOSIS — Z12.11 COLON CANCER SCREENING: ICD-10-CM

## 2025-01-17 RX ORDER — QUETIAPINE 150 MG/1
150 TABLET, FILM COATED, EXTENDED RELEASE ORAL DAILY
Qty: 90 TABLET | Refills: 3 | Status: SHIPPED | OUTPATIENT
Start: 2025-01-17

## 2025-01-17 RX ORDER — NICOTINE POLACRILEX 4 MG/1
1 GUM, CHEWING ORAL DAILY
Qty: 90 EACH | Refills: 3 | Status: SHIPPED | OUTPATIENT
Start: 2025-01-17

## 2025-01-17 NOTE — ASSESSMENT & PLAN NOTE
Stable, continue current management.   Orders:    QUEtiapine XR (SEROquel XR) 150 MG 24 hr tablet; Take 1 tablet by mouth Daily.

## 2025-01-17 NOTE — ASSESSMENT & PLAN NOTE
Doing well, in remission. Continue current management    Orders:    QUEtiapine XR (SEROquel XR) 150 MG 24 hr tablet; Take 1 tablet by mouth Daily.

## 2025-01-18 LAB
ALBUMIN SERPL-MCNC: 4.5 G/DL (ref 4.1–5.1)
ALP SERPL-CCNC: 103 IU/L (ref 44–121)
ALT SERPL-CCNC: 39 IU/L (ref 0–44)
AST SERPL-CCNC: 19 IU/L (ref 0–40)
BASOPHILS # BLD AUTO: 0.1 X10E3/UL (ref 0–0.2)
BASOPHILS NFR BLD AUTO: 1 %
BILIRUB SERPL-MCNC: 0.5 MG/DL (ref 0–1.2)
BUN SERPL-MCNC: 15 MG/DL (ref 6–24)
BUN/CREAT SERPL: 13 (ref 9–20)
CALCIUM SERPL-MCNC: 9.6 MG/DL (ref 8.7–10.2)
CHLORIDE SERPL-SCNC: 103 MMOL/L (ref 96–106)
CHOLEST SERPL-MCNC: 165 MG/DL (ref 100–199)
CHOLEST/HDLC SERPL: 3.7 RATIO (ref 0–5)
CO2 SERPL-SCNC: 21 MMOL/L (ref 20–29)
CREAT SERPL-MCNC: 1.16 MG/DL (ref 0.76–1.27)
EGFRCR SERPLBLD CKD-EPI 2021: 79 ML/MIN/1.73
EOSINOPHIL # BLD AUTO: 0.2 X10E3/UL (ref 0–0.4)
EOSINOPHIL NFR BLD AUTO: 3 %
ERYTHROCYTE [DISTWIDTH] IN BLOOD BY AUTOMATED COUNT: 13 % (ref 11.6–15.4)
GLOBULIN SER CALC-MCNC: 2.9 G/DL (ref 1.5–4.5)
GLUCOSE SERPL-MCNC: 93 MG/DL (ref 70–99)
HCT VFR BLD AUTO: 46.7 % (ref 37.5–51)
HDLC SERPL-MCNC: 45 MG/DL
HGB BLD-MCNC: 16 G/DL (ref 13–17.7)
IMM GRANULOCYTES # BLD AUTO: 0 X10E3/UL (ref 0–0.1)
IMM GRANULOCYTES NFR BLD AUTO: 0 %
LDLC SERPL CALC-MCNC: 108 MG/DL (ref 0–99)
LYMPHOCYTES # BLD AUTO: 3 X10E3/UL (ref 0.7–3.1)
LYMPHOCYTES NFR BLD AUTO: 47 %
MCH RBC QN AUTO: 30.8 PG (ref 26.6–33)
MCHC RBC AUTO-ENTMCNC: 34.3 G/DL (ref 31.5–35.7)
MCV RBC AUTO: 90 FL (ref 79–97)
MONOCYTES # BLD AUTO: 0.7 X10E3/UL (ref 0.1–0.9)
MONOCYTES NFR BLD AUTO: 11 %
NEUTROPHILS # BLD AUTO: 2.4 X10E3/UL (ref 1.4–7)
NEUTROPHILS NFR BLD AUTO: 38 %
PLATELET # BLD AUTO: 264 X10E3/UL (ref 150–450)
POTASSIUM SERPL-SCNC: 4.8 MMOL/L (ref 3.5–5.2)
PROT SERPL-MCNC: 7.4 G/DL (ref 6–8.5)
RBC # BLD AUTO: 5.2 X10E6/UL (ref 4.14–5.8)
SODIUM SERPL-SCNC: 141 MMOL/L (ref 134–144)
TRIGL SERPL-MCNC: 62 MG/DL (ref 0–149)
TSH SERPL DL<=0.005 MIU/L-ACNC: 2.31 UIU/ML (ref 0.45–4.5)
VLDLC SERPL CALC-MCNC: 12 MG/DL (ref 5–40)
WBC # BLD AUTO: 6.3 X10E3/UL (ref 3.4–10.8)

## 2025-01-20 ENCOUNTER — PATIENT MESSAGE (OUTPATIENT)
Dept: FAMILY MEDICINE CLINIC | Facility: CLINIC | Age: 45
End: 2025-01-20
Payer: COMMERCIAL

## 2025-04-09 ENCOUNTER — PREP FOR SURGERY (OUTPATIENT)
Dept: OTHER | Facility: HOSPITAL | Age: 45
End: 2025-04-09
Payer: COMMERCIAL

## 2025-04-09 DIAGNOSIS — Z12.11 ENCOUNTER FOR SCREENING COLONOSCOPY: Primary | ICD-10-CM

## 2025-04-09 RX ORDER — SODIUM CHLORIDE, SODIUM LACTATE, POTASSIUM CHLORIDE, CALCIUM CHLORIDE 600; 310; 30; 20 MG/100ML; MG/100ML; MG/100ML; MG/100ML
30 INJECTION, SOLUTION INTRAVENOUS CONTINUOUS
OUTPATIENT
Start: 2025-04-09 | End: 2025-04-10

## 2025-04-09 RX ORDER — SODIUM, POTASSIUM,MAG SULFATES 17.5-3.13G
SOLUTION, RECONSTITUTED, ORAL ORAL
Qty: 177 ML | Refills: 0 | Status: SHIPPED | OUTPATIENT
Start: 2025-04-09

## 2025-05-22 ENCOUNTER — OFFICE VISIT (OUTPATIENT)
Dept: FAMILY MEDICINE CLINIC | Facility: CLINIC | Age: 45
End: 2025-05-22
Payer: COMMERCIAL

## 2025-05-22 VITALS
SYSTOLIC BLOOD PRESSURE: 132 MMHG | HEART RATE: 74 BPM | WEIGHT: 254 LBS | HEIGHT: 69 IN | BODY MASS INDEX: 37.62 KG/M2 | DIASTOLIC BLOOD PRESSURE: 88 MMHG | RESPIRATION RATE: 20 BRPM | OXYGEN SATURATION: 99 %

## 2025-05-22 DIAGNOSIS — N45.1 EPIDIDYMITIS, LEFT: Primary | ICD-10-CM

## 2025-05-22 DIAGNOSIS — B07.8 OTHER VIRAL WARTS: ICD-10-CM

## 2025-05-22 RX ORDER — SULFAMETHOXAZOLE AND TRIMETHOPRIM 800; 160 MG/1; MG/1
1 TABLET ORAL 2 TIMES DAILY
Qty: 20 TABLET | Refills: 0 | Status: SHIPPED | OUTPATIENT
Start: 2025-05-22

## 2025-05-22 NOTE — PROGRESS NOTES
Subjective   Tommie Barron is a 45 y.o. male.   Chief Complaint   Patient presents with    Testicle Pain     History of Present Illness  About 2 day history of left testicular discomfort. No discharge. Has knot as well  Also wart        The following portions of the patient's history were reviewed and updated as appropriate: allergies, current medications, past family history, past medical history, past social history, past surgical history, and problem list.    Patient Active Problem List   Diagnosis    Anxiety    Depressive disorder    Osteoarthrosis    Encounter for annual general medical examination with abnormal findings in adult    Cough    GERD (gastroesophageal reflux disease)    Class 2 severe obesity due to excess calories with serious comorbidity and body mass index (BMI) of 38.0 to 38.9 in adult    Tobacco use    Body mass index (BMI) of 36.0 to 36.9 in adult    Ear pain    Encounter for screening colonoscopy       Current Outpatient Medications on File Prior to Visit   Medication Sig Dispense Refill    Omeprazole 20 MG tablet delayed-release Take 20 mg by mouth Daily. 90 each 3    QUEtiapine XR (SEROquel XR) 150 MG 24 hr tablet Take 1 tablet by mouth Daily. 90 tablet 3    sodium-potassium-magnesium sulfates (SUPREP) 17.5-3.13-1.6 GM/177ML solution oral solution Step 1: 5 PM the day before your scheduled colonoscopy - Take your 1st dose of bowel prep     Step 2: 5 AM - Take your 2nd dose of bowel prep     FIRST DOSE: 5 PM the day before your scheduled colonoscopy.     Pour one 6-ounce bottle of SUPREP liquid into the mixing container.     Add cool drinking water to the 16-ounce line on the container and mix; this will dilute the concentrated solution.     Drink all the liquid in the container at one time - using a straw will help.     Drink two more 16-ounce glasses of water over the next hour.     Watery bowel movements should begin in approximately one hour.     SECOND DOSE: 5 AM On the day of your  "colonoscopy.     Repeat steps 1-4.     You may still have watery bowel movements after you finish drinking the solution. (Patient not taking: Reported on 5/22/2025) 177 mL 0     No current facility-administered medications on file prior to visit.     Current outpatient and discharge medications have been reconciled for the patient.  Reviewed by: Checo Mckinley MD      No Known Allergies    Review of Systems   Constitutional:  Negative for activity change, appetite change, fatigue and fever.   HENT:  Negative for ear pain, swollen glands and voice change.    Eyes:  Negative for visual disturbance.   Respiratory:  Negative for shortness of breath and wheezing.    Cardiovascular:  Negative for chest pain and leg swelling.   Gastrointestinal:  Negative for abdominal pain, blood in stool, constipation, diarrhea, nausea and vomiting.   Endocrine: Negative for polydipsia and polyuria.   Genitourinary:  Negative for dysuria, frequency and hematuria.   Musculoskeletal:  Negative for joint swelling, neck pain and neck stiffness.   Skin:  Positive for skin lesions. Negative for rash and wound.   Neurological:  Negative for weakness, numbness and headache.   Psychiatric/Behavioral:  Negative for suicidal ideas and depressed mood.      I have reviewed and confirmed the accuracy of the ROS as documented by the MA/LPN/RN Checo Mckinley MD    Objective   Visit Vitals  /88 (BP Location: Right arm, Patient Position: Sitting, Cuff Size: Large Adult)   Pulse 74   Resp 20   Ht 175.3 cm (69\")   Wt 115 kg (254 lb)   SpO2 99%   BMI 37.51 kg/m²      **  Physical Exam  Constitutional:       Appearance: He is well-developed.   HENT:      Head: Normocephalic and atraumatic.      Right Ear: External ear normal.      Left Ear: External ear normal.      Nose: Nose normal.   Eyes:      Pupils: Pupils are equal, round, and reactive to light.   Cardiovascular:      Rate and Rhythm: Normal rate and regular rhythm.      Heart " sounds: Normal heart sounds.   Pulmonary:      Effort: Pulmonary effort is normal.      Breath sounds: Normal breath sounds.   Abdominal:      General: Bowel sounds are normal.      Palpations: Abdomen is soft.   Genitourinary:      Musculoskeletal:         General: Normal range of motion.      Cervical back: Normal range of motion and neck supple.   Skin:     General: Skin is warm and dry.   Neurological:      Mental Status: He is alert and oriented to person, place, and time.   Psychiatric:         Behavior: Behavior normal.         Thought Content: Thought content normal.         Judgment: Judgment normal.       Derm Physical Exam  Ortho Exam   Neurological Exam  Mental Status  Alert. Oriented to person, place, and time.    Cranial Nerves  CN III, IV, VI: Pupils equal round and reactive to light bilaterally.         Assessment & Plan  Epididymitis, left    Orders:    Urine Culture - Urine, Urine, Random Void    Chlamydia trachomatis, Neisseria gonorrhoeae, PCR - Urine, Urine, Clean Catch    sulfamethoxazole-trimethoprim (BACTRIM DS,SEPTRA DS) 800-160 MG per tablet; Take 1 tablet by mouth 2 (Two) Times a Day.    Other viral warts           Cryotherapy Procedure Note    Pre-operative Diagnosis: wart  Post-operative Diagnosis: same    Location: base penis      Indications: need for destruction of lesion due to growing    Anesthesia: none    Procedure Details   Patient informed of risks (permanent scarring, infection, light or dark discoloration, bleeding, infection, weakness, numbness and recurrence of the lesion) and benefits of the procedure and verbal informed consent obtained.    The area(s) were treated with liquid nitrogen therapy, frozen until ice ball extended 2 mm beyond lesion, allowed to thaw, and treated again. The patient tolerated procedure well.  The patient was instructed on post-op care, warned that there may be blister formation, redness and pain. Recommend OTC analgesia as needed for pain.    D/C  Condition: Stable      Findings discussed. All questions answered.  Follow-up in 2 weeks if not better.  Follow-up sooner for worsening symptoms or for any concerns.          Expected course, medications, and adverse effects discussed as appropriate.  Call or return if worsening or persistent symptoms.     This document is intended for medical professional use only.   Electronically signed by Checo Mckinley MD on 05/22/2025. This content may not have been proofread.

## 2025-05-24 LAB
BACTERIA UR CULT: NO GROWTH
BACTERIA UR CULT: NORMAL
C TRACH RRNA SPEC QL NAA+PROBE: NEGATIVE
N GONORRHOEA RRNA SPEC QL NAA+PROBE: NEGATIVE

## 2025-06-20 ENCOUNTER — TELEPHONE (OUTPATIENT)
Dept: FAMILY MEDICINE CLINIC | Facility: CLINIC | Age: 45
End: 2025-06-20
Payer: COMMERCIAL

## 2025-06-20 DIAGNOSIS — N45.1 EPIDIDYMITIS, LEFT: ICD-10-CM

## 2025-06-20 RX ORDER — SULFAMETHOXAZOLE AND TRIMETHOPRIM 800; 160 MG/1; MG/1
1 TABLET ORAL 2 TIMES DAILY
Qty: 28 TABLET | Refills: 0 | Status: SHIPPED | OUTPATIENT
Start: 2025-06-20 | End: 2025-07-04

## 2025-06-20 NOTE — TELEPHONE ENCOUNTER
Patient complains of testicular pain, lower abdominal pain & frequent urination. Scheduled for Monday. Patient said he was seen not long ago for same symptoms. Would Dr. Mckinley want to call in a prescription to [facility] for him to start before seeing him Monday? Should patient be seen sooner than Monday? Please advise.

## 2025-06-20 NOTE — TELEPHONE ENCOUNTER
Called patient to let him know we sent in medication for him to start. Instructed him to keep his appointment in case he was not any better.

## 2025-06-23 ENCOUNTER — OFFICE VISIT (OUTPATIENT)
Dept: FAMILY MEDICINE CLINIC | Facility: CLINIC | Age: 45
End: 2025-06-23
Payer: COMMERCIAL

## 2025-06-23 VITALS
WEIGHT: 272 LBS | DIASTOLIC BLOOD PRESSURE: 80 MMHG | HEIGHT: 69 IN | HEART RATE: 64 BPM | SYSTOLIC BLOOD PRESSURE: 112 MMHG | OXYGEN SATURATION: 98 % | BODY MASS INDEX: 40.29 KG/M2 | RESPIRATION RATE: 20 BRPM

## 2025-06-23 DIAGNOSIS — N45.1 EPIDIDYMITIS, LEFT: Primary | ICD-10-CM

## 2025-06-23 PROCEDURE — 99212 OFFICE O/P EST SF 10 MIN: CPT | Performed by: FAMILY MEDICINE

## 2025-06-23 NOTE — PROGRESS NOTES
Subjective   Tommie Barron is a 45 y.o. male.   Chief Complaint   Patient presents with    Urinary Tract Infection     Urinary Tract Infection  Chronicity:  New  Onset:  In the past 7 days  Frequency:  Constantly  Progression since onset:  Worsening  Pain quality:  Aching and shooting  Pain - numeric:  6/10  Pain severity:  Moderate  Fever:  No fever  Sexually active?: yes    Associated symptoms: flank pain, frequency, nausea and urgency         The following portions of the patient's history were reviewed and updated as appropriate: allergies, current medications, past family history, past medical history, past social history, past surgical history, and problem list.    Patient Active Problem List   Diagnosis    Anxiety    Depressive disorder    Osteoarthrosis    Encounter for annual general medical examination with abnormal findings in adult    Cough    GERD (gastroesophageal reflux disease)    Class 2 severe obesity due to excess calories with serious comorbidity and body mass index (BMI) of 38.0 to 38.9 in adult    Tobacco use    Body mass index (BMI) of 36.0 to 36.9 in adult    Ear pain    Encounter for screening colonoscopy       Current Outpatient Medications on File Prior to Visit   Medication Sig Dispense Refill    Omeprazole 20 MG tablet delayed-release Take 20 mg by mouth Daily. 90 each 3    QUEtiapine XR (SEROquel XR) 150 MG 24 hr tablet Take 1 tablet by mouth Daily. 90 tablet 3    sulfamethoxazole-trimethoprim (BACTRIM DS,SEPTRA DS) 800-160 MG per tablet Take 1 tablet by mouth 2 (Two) Times a Day for 14 days. 28 tablet 0    sodium-potassium-magnesium sulfates (SUPREP) 17.5-3.13-1.6 GM/177ML solution oral solution Step 1: 5 PM the day before your scheduled colonoscopy - Take your 1st dose of bowel prep     Step 2: 5 AM - Take your 2nd dose of bowel prep     FIRST DOSE: 5 PM the day before your scheduled colonoscopy.     Pour one 6-ounce bottle of SUPREP liquid into the mixing container.     Add cool  "drinking water to the 16-ounce line on the container and mix; this will dilute the concentrated solution.     Drink all the liquid in the container at one time - using a straw will help.     Drink two more 16-ounce glasses of water over the next hour.     Watery bowel movements should begin in approximately one hour.     SECOND DOSE: 5 AM On the day of your colonoscopy.     Repeat steps 1-4.     You may still have watery bowel movements after you finish drinking the solution. (Patient not taking: Reported on 6/23/2025) 177 mL 0     No current facility-administered medications on file prior to visit.     Current outpatient and discharge medications have been reconciled for the patient.  Reviewed by: Checo Mckinley MD      No Known Allergies    Review of Systems   Gastrointestinal:  Positive for nausea.   Genitourinary:  Positive for flank pain, frequency and urgency.     I have reviewed and confirmed the accuracy of the ROS as documented by the MA/LPN/RN Checo Mckinley MD    Objective   Visit Vitals  /80 (BP Location: Right arm, Patient Position: Sitting, Cuff Size: Large Adult)   Pulse 64   Resp 20   Ht 175.3 cm (69\")   Wt 123 kg (272 lb)   SpO2 98%   BMI 40.17 kg/m²      **  Physical Exam  Constitutional:       Appearance: He is well-developed.   HENT:      Head: Normocephalic and atraumatic.      Right Ear: External ear normal.      Left Ear: External ear normal.      Nose: Nose normal.   Eyes:      Pupils: Pupils are equal, round, and reactive to light.   Cardiovascular:      Rate and Rhythm: Normal rate and regular rhythm.      Heart sounds: Normal heart sounds.   Pulmonary:      Effort: Pulmonary effort is normal.      Breath sounds: Normal breath sounds.   Abdominal:      General: Bowel sounds are normal.      Palpations: Abdomen is soft.   Musculoskeletal:         General: Normal range of motion.      Cervical back: Normal range of motion and neck supple.   Skin:     General: Skin is " warm and dry.   Neurological:      Mental Status: He is alert and oriented to person, place, and time.   Psychiatric:         Behavior: Behavior normal.         Thought Content: Thought content normal.         Judgment: Judgment normal.       Derm Physical Exam  Ortho Exam   Neurological Exam  Mental Status  Alert. Oriented to person, place, and time.    Cranial Nerves  CN III, IV, VI: Pupils equal round and reactive to light bilaterally.         Assessment & Plan  Epididymitis, left  Finish bactrim. Urology referral of sx persist           Findings discussed. All questions answered.  Medication and medication adverse effects discussed.  Drug education given and explained to patient. Patient verbalized understanding.Follow-up for routine health maintenance as indicated.       Expected course, medications, and adverse effects discussed as appropriate.  Call or return if worsening or persistent symptoms.     This document is intended for medical professional use only.   Electronically signed by Checo Mckinley MD on 06/23/2025. This content may not have been proofread.

## 2025-07-09 ENCOUNTER — TELEPHONE (OUTPATIENT)
Dept: FAMILY MEDICINE CLINIC | Facility: CLINIC | Age: 45
End: 2025-07-09
Payer: COMMERCIAL

## 2025-07-09 DIAGNOSIS — N45.1 EPIDIDYMITIS, LEFT: Primary | ICD-10-CM

## 2025-07-09 NOTE — TELEPHONE ENCOUNTER
Caller: Tommie Barron    Relationship: Self    Best call back number: [phone]    What is the medical concern/diagnosis: Epididymitis, left     What specialty or service is being requested: Urology referral     What is the provider, practice or medical service name:     What is the office location:     What is the office phone number:     Any additional details: HAS RETURNED, WOULD LIKE A REFERRAL SENT IN